# Patient Record
Sex: FEMALE | Race: WHITE | Employment: FULL TIME | URBAN - NONMETROPOLITAN AREA
[De-identification: names, ages, dates, MRNs, and addresses within clinical notes are randomized per-mention and may not be internally consistent; named-entity substitution may affect disease eponyms.]

---

## 2018-01-20 ENCOUNTER — HOSPITAL ENCOUNTER (EMERGENCY)
Age: 28
Discharge: HOME | End: 2018-01-20
Payer: COMMERCIAL

## 2018-01-20 VITALS
RESPIRATION RATE: 16 BRPM | OXYGEN SATURATION: 96 % | TEMPERATURE: 98.7 F | SYSTOLIC BLOOD PRESSURE: 114 MMHG | DIASTOLIC BLOOD PRESSURE: 73 MMHG | HEART RATE: 102 BPM

## 2018-01-20 DIAGNOSIS — J10.1 INFLUENZA A: Primary | ICD-10-CM

## 2018-01-20 LAB
FLU A ANTIGEN: POSITIVE
FLU B ANTIGEN: NEGATIVE

## 2018-01-20 PROCEDURE — 99202 OFFICE O/P NEW SF 15 MIN: CPT | Performed by: NURSE PRACTITIONER

## 2018-01-20 PROCEDURE — 99213 OFFICE O/P EST LOW 20 MIN: CPT

## 2018-01-20 PROCEDURE — 87804 INFLUENZA ASSAY W/OPTIC: CPT

## 2018-01-20 RX ORDER — OSELTAMIVIR PHOSPHATE 75 MG/1
75 CAPSULE ORAL 2 TIMES DAILY
Qty: 10 CAPSULE | Refills: 0 | Status: SHIPPED | OUTPATIENT
Start: 2018-01-20 | End: 2018-01-25

## 2018-01-20 RX ORDER — GUAIFENESIN AND CODEINE PHOSPHATE 100; 10 MG/5ML; MG/5ML
5 SOLUTION ORAL NIGHTLY PRN
Qty: 50 ML | Refills: 0 | Status: SHIPPED | OUTPATIENT
Start: 2018-01-20 | End: 2018-01-25

## 2018-01-20 RX ORDER — ALBUTEROL SULFATE 90 UG/1
2 AEROSOL, METERED RESPIRATORY (INHALATION) 3 TIMES DAILY
Qty: 1 INHALER | Refills: 0 | Status: SHIPPED | OUTPATIENT
Start: 2018-01-20 | End: 2020-04-23

## 2018-01-20 RX ORDER — PREDNISONE 10 MG/1
10 TABLET ORAL 2 TIMES DAILY
Qty: 10 TABLET | Refills: 0 | Status: SHIPPED | OUTPATIENT
Start: 2018-01-20 | End: 2018-01-25

## 2018-01-20 RX ORDER — ONDANSETRON 4 MG/1
4 TABLET, ORALLY DISINTEGRATING ORAL EVERY 8 HOURS PRN
Qty: 15 TABLET | Refills: 0 | Status: SHIPPED | OUTPATIENT
Start: 2018-01-20 | End: 2018-01-25

## 2018-01-20 ASSESSMENT — ENCOUNTER SYMPTOMS
VOMITING: 0
COUGH: 1
FLU SYMPTOMS: 1
SHORTNESS OF BREATH: 0
NAUSEA: 0
DIARRHEA: 0
SORE THROAT: 1
RHINORRHEA: 1

## 2018-01-20 ASSESSMENT — PAIN DESCRIPTION - PAIN TYPE: TYPE: ACUTE PAIN

## 2018-01-20 ASSESSMENT — PAIN DESCRIPTION - FREQUENCY: FREQUENCY: CONTINUOUS

## 2018-01-20 ASSESSMENT — PAIN DESCRIPTION - DESCRIPTORS: DESCRIPTORS: ACHING

## 2018-01-20 ASSESSMENT — PAIN DESCRIPTION - LOCATION: LOCATION: OTHER (COMMENT)

## 2018-01-20 ASSESSMENT — PAIN SCALES - GENERAL: PAINLEVEL_OUTOF10: 3

## 2018-01-20 NOTE — ED PROVIDER NOTES
[address]  Urgent Care Encounter      CHIEF COMPLAINT       Chief Complaint   Patient presents with    Fever     onset Thursday     Cough     onset Wednesday night, cough is gone     Generalized Body Aches       Nurses Notes reviewed and I agree except as noted in the HPI. HISTORY OF PRESENT ILLNESS   Karena Fields is a 32 y.o. The history is provided by the patient. No  was used. Influenza   Presenting symptoms: cough, fatigue, fever, headache, myalgias, rhinorrhea and sore throat    Presenting symptoms: no diarrhea, no nausea, no shortness of breath and no vomiting    Severity:  Severe  Onset quality:  Sudden  Duration:  3 days  Progression:  Unchanged  Chronicity:  New  Relieved by:  Nothing  Worsened by:  Nothing  Ineffective treatments:  OTC medications  Associated symptoms: chills, decreased appetite, decreased physical activity, ear pain and nasal congestion    Associated symptoms: no mental status change, no neck stiffness and no syncope    Risk factors: sick contacts    Risk factors: not elderly, no diabetes problem, no heart disease, no immunocompromised state, no kidney disease, no liver disease and not pregnant        REVIEW OF SYSTEMS     Review of Systems   Constitutional: Positive for chills, decreased appetite, fatigue and fever. HENT: Positive for congestion, ear pain, rhinorrhea and sore throat. Respiratory: Positive for cough. Negative for shortness of breath. Gastrointestinal: Negative for diarrhea, nausea and vomiting. Musculoskeletal: Positive for myalgias. Negative for neck stiffness. Neurological: Positive for headaches. PAST MEDICAL HISTORY         Diagnosis Date    Infertility, female     took 1 round of clomid       SURGICAL HISTORY     Patient  has a past surgical history that includes [place] tooth extraction.     CURRENT MEDICATIONS       Discharge Medication List as of 1/20/2018  2:33 PM      CONTINUE these medications Skin: Skin is warm and dry. She is not diaphoretic. Psychiatric: She has a normal mood and affect. Her behavior is normal. Judgment and thought content normal.   Nursing note and vitals reviewed. DIAGNOSTIC RESULTS   Labs:  Results for orders placed or performed during the hospital encounter of 01/20/18   Rapid influenza A/B antigens   Result Value Ref Range    Flu A Antigen POSITIVE (A) NEGATIVE    Flu B Antigen NEGATIVE NEGATIVE       IMAGING:  No orders to display     URGENT CARE COURSE:     Vitals:    01/20/18 1243   BP: 114/73   Pulse: 102   Resp: 16   Temp: 98.7 °F (37.1 °C)   TempSrc: Oral   SpO2: 96%       Medications - No data to display  PROCEDURES:  None  FINAL IMPRESSION      1. Influenza A        DISPOSITION/PLAN   Decision To Discharge     The patient was advised to drink plenty of fluids. They may take Tylenol for fever or body aches. Take prescribed medication as directed. They may also take OTC antihistamines/ decongestant as needed. Pt is advised to go to ER if symptoms worsen, new symptoms develop, high fever >102, vomiting, breathing difficulty, lethargy, chest pain or shortness of breath Dial 911. The patient or patient's representative is agreeable to the treatment plan they're advised to follow-up with her primary care provider in one week for reevaluation.       PATIENT REFERRED TO:  DO Awilda Lopez [address]    Schedule an appointment as soon as possible for a visit in 1 week  For re-check    DISCHARGE MEDICATIONS:  Discharge Medication List as of 1/20/2018  2:33 PM      START taking these medications    Details   oseltamivir (TAMIFLU) 75 MG capsule Take 1 capsule by mouth 2 times daily for 5 days, Disp-10 capsule, R-0Normal      ondansetron (ZOFRAN ODT) 4 MG disintegrating tablet Take 1 tablet by mouth every 8 hours as needed for Nausea, Disp-15 tablet, R-0Normal      guaiFENesin-codeine (TUSSI-ORGANIDIN NR) 100-10 MG/5ML syrup Take 5

## 2018-01-20 NOTE — ED TRIAGE NOTES
Patient walked to room 1 for fever, cough and body aches onset Wednesday night into Thursday. No other needs.

## 2018-04-18 ENCOUNTER — HOSPITAL ENCOUNTER (OUTPATIENT)
Dept: ULTRASOUND IMAGING | Age: 28
Discharge: HOME | End: 2018-04-18
Payer: COMMERCIAL

## 2018-04-18 DIAGNOSIS — R10.13 EPIGASTRIC PAIN: ICD-10-CM

## 2018-04-18 PROCEDURE — 76705 ECHO EXAM OF ABDOMEN: CPT

## 2019-09-17 ENCOUNTER — HOSPITAL ENCOUNTER (OUTPATIENT)
Age: 29
Discharge: HOME | End: 2019-09-17
Payer: COMMERCIAL

## 2019-09-17 LAB
ANION GAP SERPL CALCULATED.3IONS-SCNC: 14 MEQ/L (ref 8–16)
BASOPHILS # BLD: 0.8 %
BASOPHILS ABSOLUTE: 0.1 THOU/MM3 (ref 0–0.1)
BUN BLDV-MCNC: 10 MG/DL (ref 7–22)
CALCIUM SERPL-MCNC: 9.1 MG/DL (ref 8.5–10.5)
CHLORIDE BLD-SCNC: 97 MEQ/L (ref 98–111)
CO2: 27 MEQ/L (ref 23–33)
CREAT SERPL-MCNC: 0.9 MG/DL (ref 0.4–1.2)
EOSINOPHIL # BLD: 0.6 %
EOSINOPHILS ABSOLUTE: 0 THOU/MM3 (ref 0–0.4)
ERYTHROCYTE [DISTWIDTH] IN BLOOD BY AUTOMATED COUNT: 12.1 % (ref 11.5–14.5)
ERYTHROCYTE [DISTWIDTH] IN BLOOD BY AUTOMATED COUNT: 40.3 FL (ref 35–45)
GFR SERPL CREATININE-BSD FRML MDRD: 74 ML/MIN/1.73M2
GLUCOSE BLD-MCNC: 116 MG/DL (ref 70–108)
HCT VFR BLD CALC: 39.1 % (ref 37–47)
HEMOGLOBIN: 12.8 GM/DL (ref 12–16)
IMMATURE GRANS (ABS): 0.01 THOU/MM3 (ref 0–0.07)
IMMATURE GRANULOCYTES: 0 %
LYMPHOCYTES # BLD: 30.7 %
LYMPHOCYTES ABSOLUTE: 1.9 THOU/MM3 (ref 1–4.8)
MCH RBC QN AUTO: 29.8 PG (ref 26–33)
MCHC RBC AUTO-ENTMCNC: 32.7 GM/DL (ref 32.2–35.5)
MCV RBC AUTO: 91.1 FL (ref 81–99)
MONOCYTES # BLD: 7 %
MONOCYTES ABSOLUTE: 0.4 THOU/MM3 (ref 0.4–1.3)
NUCLEATED RED BLOOD CELLS: 0 /100 WBC
PLATELET # BLD: 248 THOU/MM3 (ref 130–400)
PMV BLD AUTO: 10.4 FL (ref 9.4–12.4)
POTASSIUM SERPL-SCNC: 3.7 MEQ/L (ref 3.5–5.2)
RBC # BLD: 4.29 MILL/MM3 (ref 4.2–5.4)
SEG NEUTROPHILS: 60.7 %
SEGMENTED NEUTROPHILS ABSOLUTE COUNT: 3.8 THOU/MM3 (ref 1.8–7.7)
SODIUM BLD-SCNC: 138 MEQ/L (ref 135–145)
WBC # BLD: 6.3 THOU/MM3 (ref 4.8–10.8)

## 2019-09-17 PROCEDURE — 36415 COLL VENOUS BLD VENIPUNCTURE: CPT

## 2019-09-17 PROCEDURE — 80048 BASIC METABOLIC PNL TOTAL CA: CPT

## 2019-09-17 PROCEDURE — 85025 COMPLETE CBC W/AUTO DIFF WBC: CPT

## 2020-03-12 ENCOUNTER — HOSPITAL ENCOUNTER (OUTPATIENT)
Dept: ULTRASOUND IMAGING | Age: 30
Discharge: HOME | End: 2020-03-12
Payer: COMMERCIAL

## 2020-03-12 PROCEDURE — 76705 ECHO EXAM OF ABDOMEN: CPT

## 2020-03-20 ENCOUNTER — HOSPITAL ENCOUNTER (OUTPATIENT)
Dept: CT IMAGING | Age: 30
Discharge: HOME | End: 2020-03-20
Payer: COMMERCIAL

## 2020-03-20 PROCEDURE — 6360000004 HC RX CONTRAST MEDICATION: Performed by: FAMILY MEDICINE

## 2020-03-20 PROCEDURE — 74160 CT ABDOMEN W/CONTRAST: CPT

## 2020-03-20 RX ADMIN — IOPAMIDOL 85 ML: 755 INJECTION, SOLUTION INTRAVENOUS at 09:31

## 2020-04-23 ENCOUNTER — OFFICE VISIT (OUTPATIENT)
Dept: SURGERY | Age: 30
End: 2020-04-23
Payer: COMMERCIAL

## 2020-04-23 VITALS
TEMPERATURE: 97.4 F | OXYGEN SATURATION: 98 % | WEIGHT: 166 LBS | SYSTOLIC BLOOD PRESSURE: 100 MMHG | HEIGHT: 66 IN | HEART RATE: 98 BPM | RESPIRATION RATE: 20 BRPM | BODY MASS INDEX: 26.68 KG/M2 | DIASTOLIC BLOOD PRESSURE: 64 MMHG

## 2020-04-23 PROCEDURE — G8427 DOCREV CUR MEDS BY ELIG CLIN: HCPCS | Performed by: SURGERY

## 2020-04-23 PROCEDURE — G8419 CALC BMI OUT NRM PARAM NOF/U: HCPCS | Performed by: SURGERY

## 2020-04-23 PROCEDURE — 99243 OFF/OP CNSLTJ NEW/EST LOW 30: CPT | Performed by: SURGERY

## 2020-04-23 RX ORDER — NORGESTIMATE AND ETHINYL ESTRADIOL 0.25-0.035
1 KIT ORAL DAILY
Status: ON HOLD | COMMUNITY
End: 2021-07-01

## 2020-04-23 ASSESSMENT — ENCOUNTER SYMPTOMS
VOMITING: 0
BACK PAIN: 1
NAUSEA: 1
COUGH: 0
SORE THROAT: 0
TROUBLE SWALLOWING: 0
COLOR CHANGE: 0
BLOOD IN STOOL: 0
SHORTNESS OF BREATH: 0
ABDOMINAL DISTENTION: 1
ABDOMINAL PAIN: 1
VOICE CHANGE: 0
DIARRHEA: 1
WHEEZING: 0

## 2020-04-23 NOTE — PATIENT INSTRUCTIONS
Patient Education        Cholecystectomy: Before Your Surgery  What is cholecystectomy? Cholecystectomy (be-sej-mmv-ALFONSO-tuh-john) is a type of surgery. It removes a diseased gallbladder. This surgery is usually done as a laparoscopic surgery. The doctor puts a lighted tube and other surgical tools through small cuts (incisions) in your belly. The tube is called a scope. It lets your doctor see your organs so he or she can do the surgery. The incisions leave scars that fade with time. Most people go home the same day. You probably will feel better each day. Most people have only a small amount of pain after 1 week. If you have a desk job, you can probably go back to work in 1 to 2 weeks. If you lift heavy objects or have a very active job, it may take up to 4 weeks. In some cases, open surgery is the best choice. Your doctor may choose open surgery in advance. Or he or she may choose it in the middle of laparoscopic surgery. In open surgery, the doctor makes a larger incision in your upper belly. If you have open surgery, you will probably stay in the hospital for 2 to 4 days. And it may take 4 to 6 weeks to get back to your normal routine. Follow-up care is a key part of your treatment and safety. Be sure to make and go to all appointments, and call your doctor if you are having problems. It's also a good idea to know your test results and keep a list of the medicines you take. What happens before surgery?   Surgery can be stressful. This information will help you understand what you can expect. And it will help you safely prepare for surgery.   Preparing for surgery    · Understand exactly what surgery is planned, along with the risks, benefits, and other options. · Tell your doctors ALL the medicines, vitamins, supplements, and herbal remedies you take.  Some of these can increase the risk of bleeding or interact with anesthesia.     · If you take aspirin or some other blood thinner, ask your doctor if driving, and getting back to your normal routine. When should you call your doctor? · You have questions or concerns.     · You don't understand how to prepare for your surgery.     · You become ill before the surgery (such as fever, flu, or a cold).     · You need to reschedule or have changed your mind about having the surgery. Where can you learn more? Go to https://Provus Labpepicewnorin.tv.soup.me. org and sign in to your [employer] account. Enter H065 in the [employer] box to learn more about \"Cholecystectomy: Before Your Surgery. \"     If you do not have an account, please click on the \"Sign Up Now\" link. Current as of: August 11, 2019Content Version: 12.4  © 3965-6971 Healthwise, Incorporated. Care instructions adapted under license by [facility] ([facility]). If you have questions about a medical condition or this instruction, always ask your healthcare professional. [place] [address] any warranty or liability for your use of this information. Patient Education        Low-Fat Diet for Gallbladder Disease: Care Instructions  Your Care Instructions    When you eat, the gallbladder releases bile, which helps you digest the fat in food. If you have an inflamed gallbladder, this may cause pain. A low-fat diet may give your gallbladder a rest so you can start to heal. Your doctor and dietitian can help you make an eating plan that does not irritate your digestive system. Always talk with your doctor or dietitian before you make changes in your diet. Follow-up care is a key part of your treatment and safety. Be sure to make and go to all appointments, and call your doctor if you are having problems. It's also a good idea to know your test results and keep a list of the medicines you take. How can you care for yourself at home? · Eat many small meals and snacks each day instead of three large meals. · Choose lean meats. ? Eat no more than 5 to 6½ ounces of meat a day.   ? Cut off all fat you can see. ? Eat chicken and turkey without the skin. ? Many types of fish, such as salmon, lake trout, tuna, and herring, provide healthy omega-3 fat. But, avoid fish canned in oil, such as sardines in olive oil. ? Bake, broil, or grill meats, poultry, or fish instead of frying them in butter or fat. · Drink or eat nonfat or low-fat milk, yogurt, cheese, or other milk products each day. ? Read the labels on cheeses, and choose those with less than 5 grams of fat an ounce. ? Try fat-free sour cream, cream cheese, or yogurt. ? Avoid cream soups and cream sauces on pasta. ? Eat low-fat ice cream, frozen yogurt, or sorbet. Avoid regular ice cream.  · Eat whole-grain cereals, breads, crackers, rice, or pasta. Avoid high-fat foods such as croissants, scones, biscuits, waffles, doughnuts, muffins, granola, and high-fat breads. · Flavor your foods with herbs and spices (such as basil, tarragon, or mint), fat-free sauces, or lemon juice instead of butter. You can also use butter substitutes, fat-free mayonnaise, or fat-free dressing. · Try applesauce, prune puree, or mashed bananas to replace some or all of the fat when you bake. · Limit fats and oils, such as butter, margarine, mayonnaise, and salad dressing, to no more than 1 tablespoon a meal.  · Avoid high-fat foods, such as:  ? Chocolate, whole milk, ice cream, and processed cheese. ? Fried or buttered foods. ? Sausage, salami, and martínez. ? Cinnamon rolls, cakes, pies, cookies, and other pastries. ? Prepared snack foods, such as potato chips, nut and granola bars, and mixed nuts. ? Coconut and avocado. · Learn how to read food labels for serving sizes and ingredients. Fast-food and convenience-food meals often have lots of fat. Where can you learn more? Go to https://RADSONEalexi.health-partners. org and sign in to your M-DISChart account.  Enter A473 in the [facility] box to learn more about \"Low-Fat Diet for Gallbladder Disease: Care

## 2020-04-23 NOTE — PROGRESS NOTES
Anabel Lindsay MD   General Surgery  New Patient Evaluation in Office  Pt Name: Gage Jones  Date of Birth 1990   Today's Date: 4/23/2020  Medical Record Number: [id number]  Referring Provider: Diana Oliva DO  Primary Care Provider: Chloe Correa DO  Chief Complaint:  Chief Complaint   Patient presents with   [employer] [facility] Consult     ref by Dr. Latonia Norton in gallbladder US at [address]      1. Gallbladder sludge    2. RUQ abdominal pain    3. Recurrent biliary colic         PLANS      1. Schedule patient for robotic assisted laparoscopic cholecystectomy. 2.  General anesthesia  3. Patient with recurring episodes of pain with pain occurring greater than 50% of the time with inability to eat and weight loss. 4.  Hepatic function tests  5. Risks of procedure including but not limited to bleeding, infection, conversion open procedure, postoperative diarrhea, persistence of symptoms, bile duct leak, bile duct injury were all discussed. Patient expressed understanding wishes to proceed with surgical intervention. Lesa De Luna is a 27y.o. year old female who is presenting today in the office for evaluation of biliary colic symptoms which are persistent with a large amount of sludge in the gallbladder. Elsa was referred to our office for evaluation by her primary care provider Dr. Yvrose Hilliard. Elsa reports a greater than 2-week history of severe pain in the right upper quadrant with radiation to the back as well as in the right flank. She has had no fever nor chills. She initially had some diarrhea and very pale almost acholic stools but this has resolved. She underwent an ultrasound of the gallbladder which revealed large amounts of sludge within the gallbladder. There is also question of a liver cyst.  She then underwent a CT scan of the abdomen and pelvis which revealed small benign simple cysts of the liver. Gallbladder was distended.   She had some mildly enlarged file   Social Needs    Financial resource strain: Not on file    Food insecurity     Worry: Not on file     Inability: Not on file    Transportation needs     Medical: Not on file     Non-medical: Not on file   Tobacco Use    Smoking status: Never Smoker    Smokeless tobacco: Never Used   Substance and Sexual Activity    Alcohol use: No    Drug use: No    Sexual activity: Yes     Partners: Male   Lifestyle    Physical activity     Days per week: Not on file     Minutes per session: Not on file    Stress: Not on file   Relationships    Social connections     Talks on phone: Not on file     Gets together: Not on file     Attends Restorationism service: Not on file     Active member of club or organization: Not on file     Attends meetings of clubs or organizations: Not on file     Relationship status: Not on file    Intimate partner violence     Fear of current or ex partner: Not on file     Emotionally abused: Not on file     Physically abused: Not on file     Forced sexual activity: Not on file   Other Topics Concern    Not on file   Social History Narrative    Not on file           Review of Systems  Review of Systems   Constitutional: Positive for activity change, appetite change and fatigue. Negative for chills, fever and unexpected weight change. HENT: Negative for sore throat, trouble swallowing and voice change. Eyes: Negative for visual disturbance. Respiratory: Negative for cough, shortness of breath and wheezing. Cardiovascular: Negative for chest pain and palpitations. Gastrointestinal: Positive for abdominal distention, abdominal pain, diarrhea and nausea. Negative for blood in stool and vomiting. Diarrhea now resolved   Endocrine: Negative for cold intolerance, heat intolerance and polydipsia. Genitourinary: Positive for flank pain. Negative for dysuria and hematuria. Musculoskeletal: Positive for back pain. Negative for gait problem, joint swelling and myalgias.    Skin: Negative for color change and rash. Allergic/Immunologic: Negative for immunocompromised state. Neurological: Negative for dizziness, tremors, seizures and speech difficulty. Hematological: Does not bruise/bleed easily. Psychiatric/Behavioral: Negative for behavioral problems, confusion and suicidal ideas. OBJECTIVE     /64 (Site: Right Upper Arm, Position: Sitting, Cuff Size: Medium Adult)   Pulse 98   Temp 97.4 °F (36.3 °C) (Temporal)   Resp 20   Ht 5' 6\" (1.676 m)   Wt 166 lb (75.3 kg)   LMP 03/18/2020 (Approximate)   SpO2 98%   Breastfeeding No   BMI 26.79 kg/m²      Physical Exam  Vitals signs reviewed. Constitutional:       General: She is not in acute distress. Appearance: She is well-developed and normal weight. She is not diaphoretic. HENT:      Head: Normocephalic and atraumatic. Nose: Nose normal. No congestion or rhinorrhea. Mouth/Throat:      Mouth: Mucous membranes are moist.   Eyes:      General: No scleral icterus. Pupils: Pupils are equal, round, and reactive to light. Neck:      Musculoskeletal: Normal range of motion and neck supple. Thyroid: No thyromegaly. Vascular: No JVD. Trachea: No tracheal deviation. Cardiovascular:      Rate and Rhythm: Normal rate and regular rhythm. Heart sounds: Normal heart sounds. No murmur. Pulmonary:      Effort: Pulmonary effort is normal. No respiratory distress. Breath sounds: Normal breath sounds. No wheezing. Abdominal:      General: Bowel sounds are normal. There is no distension. Palpations: Abdomen is soft. There is no mass. Tenderness: There is abdominal tenderness. There is no guarding or rebound. Hernia: No hernia is present. Comments: Pain with palpation of the right upper quadrant. No mass palpable. Musculoskeletal: Normal range of motion. Right lower leg: No edema. Left lower leg: No edema.    Lymphadenopathy:      Cervical: No cervical

## 2020-04-27 ENCOUNTER — ANESTHESIA EVENT (OUTPATIENT)
Dept: OPERATING ROOM | Age: 30
End: 2020-04-27
Payer: COMMERCIAL

## 2020-04-27 ENCOUNTER — HOSPITAL ENCOUNTER (OUTPATIENT)
Age: 30
Setting detail: OUTPATIENT SURGERY
Discharge: HOME | End: 2020-04-27
Attending: SURGERY | Admitting: SURGERY
Payer: COMMERCIAL

## 2020-04-27 ENCOUNTER — ANESTHESIA (OUTPATIENT)
Dept: OPERATING ROOM | Age: 30
End: 2020-04-27
Payer: COMMERCIAL

## 2020-04-27 VITALS — TEMPERATURE: 98.1 F | DIASTOLIC BLOOD PRESSURE: 74 MMHG | SYSTOLIC BLOOD PRESSURE: 115 MMHG | OXYGEN SATURATION: 99 %

## 2020-04-27 VITALS
HEIGHT: 66 IN | DIASTOLIC BLOOD PRESSURE: 57 MMHG | SYSTOLIC BLOOD PRESSURE: 102 MMHG | BODY MASS INDEX: 26.55 KG/M2 | TEMPERATURE: 97.1 F | OXYGEN SATURATION: 97 % | RESPIRATION RATE: 17 BRPM | HEART RATE: 82 BPM | WEIGHT: 165.2 LBS

## 2020-04-27 PROBLEM — K80.50 BILIARY COLIC: Status: ACTIVE | Noted: 2020-04-27

## 2020-04-27 LAB — PREGNANCY, URINE: NEGATIVE

## 2020-04-27 PROCEDURE — 88304 TISSUE EXAM BY PATHOLOGIST: CPT

## 2020-04-27 PROCEDURE — 7100000000 HC PACU RECOVERY - FIRST 15 MIN: Performed by: SURGERY

## 2020-04-27 PROCEDURE — S2900 ROBOTIC SURGICAL SYSTEM: HCPCS | Performed by: SURGERY

## 2020-04-27 PROCEDURE — 2500000003 HC RX 250 WO HCPCS: Performed by: SURGERY

## 2020-04-27 PROCEDURE — 47563 LAPARO CHOLECYSTECTOMY/GRAPH: CPT | Performed by: SURGERY

## 2020-04-27 PROCEDURE — 2580000003 HC RX 258: Performed by: SURGERY

## 2020-04-27 PROCEDURE — 2709999900 HC NON-CHARGEABLE SUPPLY: Performed by: SURGERY

## 2020-04-27 PROCEDURE — 6370000000 HC RX 637 (ALT 250 FOR IP): Performed by: SURGERY

## 2020-04-27 PROCEDURE — 3600000009 HC SURGERY ROBOT BASE: Performed by: SURGERY

## 2020-04-27 PROCEDURE — 7100000001 HC PACU RECOVERY - ADDTL 15 MIN: Performed by: SURGERY

## 2020-04-27 PROCEDURE — 7100000011 HC PHASE II RECOVERY - ADDTL 15 MIN: Performed by: SURGERY

## 2020-04-27 PROCEDURE — 2720000010 HC SURG SUPPLY STERILE: Performed by: SURGERY

## 2020-04-27 PROCEDURE — 6360000002 HC RX W HCPCS: Performed by: SURGERY

## 2020-04-27 PROCEDURE — 3700000000 HC ANESTHESIA ATTENDED CARE: Performed by: SURGERY

## 2020-04-27 PROCEDURE — 3700000001 HC ADD 15 MINUTES (ANESTHESIA): Performed by: SURGERY

## 2020-04-27 PROCEDURE — 7100000010 HC PHASE II RECOVERY - FIRST 15 MIN: Performed by: SURGERY

## 2020-04-27 PROCEDURE — 2500000003 HC RX 250 WO HCPCS: Performed by: NURSE ANESTHETIST, CERTIFIED REGISTERED

## 2020-04-27 PROCEDURE — C1894 INTRO/SHEATH, NON-LASER: HCPCS | Performed by: SURGERY

## 2020-04-27 PROCEDURE — 81025 URINE PREGNANCY TEST: CPT

## 2020-04-27 PROCEDURE — 6360000002 HC RX W HCPCS: Performed by: NURSE ANESTHETIST, CERTIFIED REGISTERED

## 2020-04-27 PROCEDURE — 3600000019 HC SURGERY ROBOT ADDTL 15MIN: Performed by: SURGERY

## 2020-04-27 RX ORDER — ONDANSETRON 2 MG/ML
INJECTION INTRAMUSCULAR; INTRAVENOUS PRN
Status: DISCONTINUED | OUTPATIENT
Start: 2020-04-27 | End: 2020-04-27 | Stop reason: SDUPTHER

## 2020-04-27 RX ORDER — FENTANYL CITRATE 50 UG/ML
50 INJECTION, SOLUTION INTRAMUSCULAR; INTRAVENOUS EVERY 5 MIN PRN
Status: DISCONTINUED | OUTPATIENT
Start: 2020-04-27 | End: 2020-04-27 | Stop reason: HOSPADM

## 2020-04-27 RX ORDER — NEOSTIGMINE METHYLSULFATE 5 MG/5 ML
SYRINGE (ML) INTRAVENOUS PRN
Status: DISCONTINUED | OUTPATIENT
Start: 2020-04-27 | End: 2020-04-27 | Stop reason: SDUPTHER

## 2020-04-27 RX ORDER — PHENYLEPHRINE HYDROCHLORIDE 10 MG/ML
INJECTION INTRAVENOUS PRN
Status: DISCONTINUED | OUTPATIENT
Start: 2020-04-27 | End: 2020-04-27 | Stop reason: SDUPTHER

## 2020-04-27 RX ORDER — SODIUM CHLORIDE 0.9 % (FLUSH) 0.9 %
10 SYRINGE (ML) INJECTION EVERY 12 HOURS SCHEDULED
Status: DISCONTINUED | OUTPATIENT
Start: 2020-04-27 | End: 2020-04-27 | Stop reason: HOSPADM

## 2020-04-27 RX ORDER — HYDROCODONE BITARTRATE AND ACETAMINOPHEN 5; 325 MG/1; MG/1
1 TABLET ORAL EVERY 4 HOURS PRN
Status: DISCONTINUED | OUTPATIENT
Start: 2020-04-27 | End: 2020-04-27 | Stop reason: HOSPADM

## 2020-04-27 RX ORDER — SODIUM CHLORIDE 0.9 % (FLUSH) 0.9 %
10 SYRINGE (ML) INJECTION PRN
Status: DISCONTINUED | OUTPATIENT
Start: 2020-04-27 | End: 2020-04-27 | Stop reason: HOSPADM

## 2020-04-27 RX ORDER — MIDAZOLAM HYDROCHLORIDE 1 MG/ML
INJECTION INTRAMUSCULAR; INTRAVENOUS PRN
Status: DISCONTINUED | OUTPATIENT
Start: 2020-04-27 | End: 2020-04-27 | Stop reason: SDUPTHER

## 2020-04-27 RX ORDER — PROPOFOL 10 MG/ML
INJECTION, EMULSION INTRAVENOUS PRN
Status: DISCONTINUED | OUTPATIENT
Start: 2020-04-27 | End: 2020-04-27 | Stop reason: SDUPTHER

## 2020-04-27 RX ORDER — PROMETHAZINE HYDROCHLORIDE 25 MG/ML
12.5 INJECTION, SOLUTION INTRAMUSCULAR; INTRAVENOUS
Status: DISCONTINUED | OUTPATIENT
Start: 2020-04-27 | End: 2020-04-27 | Stop reason: HOSPADM

## 2020-04-27 RX ORDER — PROMETHAZINE HYDROCHLORIDE 25 MG/1
12.5 TABLET ORAL EVERY 6 HOURS PRN
Status: DISCONTINUED | OUTPATIENT
Start: 2020-04-27 | End: 2020-04-27 | Stop reason: HOSPADM

## 2020-04-27 RX ORDER — HYDROCODONE BITARTRATE AND ACETAMINOPHEN 5; 325 MG/1; MG/1
2 TABLET ORAL EVERY 4 HOURS PRN
Status: DISCONTINUED | OUTPATIENT
Start: 2020-04-27 | End: 2020-04-27 | Stop reason: HOSPADM

## 2020-04-27 RX ORDER — ROCURONIUM BROMIDE 10 MG/ML
INJECTION, SOLUTION INTRAVENOUS PRN
Status: DISCONTINUED | OUTPATIENT
Start: 2020-04-27 | End: 2020-04-27 | Stop reason: SDUPTHER

## 2020-04-27 RX ORDER — SUCCINYLCHOLINE/SOD CL,ISO/PF 200MG/10ML
SYRINGE (ML) INTRAVENOUS PRN
Status: DISCONTINUED | OUTPATIENT
Start: 2020-04-27 | End: 2020-04-27 | Stop reason: SDUPTHER

## 2020-04-27 RX ORDER — SODIUM CHLORIDE 9 MG/ML
INJECTION, SOLUTION INTRAVENOUS CONTINUOUS
Status: DISCONTINUED | OUTPATIENT
Start: 2020-04-27 | End: 2020-04-27 | Stop reason: HOSPADM

## 2020-04-27 RX ORDER — LIDOCAINE HYDROCHLORIDE 20 MG/ML
INJECTION, SOLUTION INTRAVENOUS PRN
Status: DISCONTINUED | OUTPATIENT
Start: 2020-04-27 | End: 2020-04-27 | Stop reason: SDUPTHER

## 2020-04-27 RX ORDER — BUPIVACAINE HYDROCHLORIDE 5 MG/ML
INJECTION, SOLUTION PERINEURAL PRN
Status: DISCONTINUED | OUTPATIENT
Start: 2020-04-27 | End: 2020-04-27 | Stop reason: ALTCHOICE

## 2020-04-27 RX ORDER — ONDANSETRON 4 MG/1
4 TABLET, ORALLY DISINTEGRATING ORAL EVERY 8 HOURS PRN
Qty: 6 TABLET | Refills: 0 | Status: ON HOLD | OUTPATIENT
Start: 2020-04-27 | End: 2021-07-01

## 2020-04-27 RX ORDER — FENTANYL CITRATE 50 UG/ML
INJECTION, SOLUTION INTRAMUSCULAR; INTRAVENOUS PRN
Status: DISCONTINUED | OUTPATIENT
Start: 2020-04-27 | End: 2020-04-27 | Stop reason: SDUPTHER

## 2020-04-27 RX ORDER — LABETALOL 20 MG/4 ML (5 MG/ML) INTRAVENOUS SYRINGE
10 EVERY 10 MIN PRN
Status: DISCONTINUED | OUTPATIENT
Start: 2020-04-27 | End: 2020-04-27 | Stop reason: HOSPADM

## 2020-04-27 RX ORDER — GLYCOPYRROLATE 1 MG/5 ML
SYRINGE (ML) INTRAVENOUS PRN
Status: DISCONTINUED | OUTPATIENT
Start: 2020-04-27 | End: 2020-04-27 | Stop reason: SDUPTHER

## 2020-04-27 RX ORDER — KETOROLAC TROMETHAMINE 30 MG/ML
INJECTION, SOLUTION INTRAMUSCULAR; INTRAVENOUS PRN
Status: DISCONTINUED | OUTPATIENT
Start: 2020-04-27 | End: 2020-04-27 | Stop reason: SDUPTHER

## 2020-04-27 RX ORDER — ONDANSETRON 2 MG/ML
4 INJECTION INTRAMUSCULAR; INTRAVENOUS EVERY 6 HOURS PRN
Status: DISCONTINUED | OUTPATIENT
Start: 2020-04-27 | End: 2020-04-27 | Stop reason: HOSPADM

## 2020-04-27 RX ORDER — INDOCYANINE GREEN AND WATER 25 MG
2.5 KIT INJECTION ONCE
Status: COMPLETED | OUTPATIENT
Start: 2020-04-27 | End: 2020-04-27

## 2020-04-27 RX ORDER — HYDROCODONE BITARTRATE AND ACETAMINOPHEN 5; 325 MG/1; MG/1
1 TABLET ORAL EVERY 6 HOURS PRN
Qty: 28 TABLET | Refills: 0 | Status: SHIPPED | OUTPATIENT
Start: 2020-04-27 | End: 2020-05-04

## 2020-04-27 RX ORDER — DEXAMETHASONE SODIUM PHOSPHATE 4 MG/ML
INJECTION, SOLUTION INTRA-ARTICULAR; INTRALESIONAL; INTRAMUSCULAR; INTRAVENOUS; SOFT TISSUE PRN
Status: DISCONTINUED | OUTPATIENT
Start: 2020-04-27 | End: 2020-04-27 | Stop reason: SDUPTHER

## 2020-04-27 RX ADMIN — PROPOFOL 50 MG: 10 INJECTION, EMULSION INTRAVENOUS at 08:52

## 2020-04-27 RX ADMIN — DEXAMETHASONE SODIUM PHOSPHATE 10 MG: 4 INJECTION, SOLUTION INTRAMUSCULAR; INTRAVENOUS at 09:05

## 2020-04-27 RX ADMIN — ONDANSETRON HYDROCHLORIDE 4 MG: 4 INJECTION, SOLUTION INTRAMUSCULAR; INTRAVENOUS at 09:05

## 2020-04-27 RX ADMIN — Medication 4 MG: at 09:15

## 2020-04-27 RX ADMIN — FENTANYL CITRATE 25 MCG: 50 INJECTION, SOLUTION INTRAMUSCULAR; INTRAVENOUS at 09:30

## 2020-04-27 RX ADMIN — MIDAZOLAM HYDROCHLORIDE 2 MG: 1 INJECTION, SOLUTION INTRAMUSCULAR; INTRAVENOUS at 08:42

## 2020-04-27 RX ADMIN — PHENYLEPHRINE HYDROCHLORIDE 150 MCG: 10 INJECTION INTRAVENOUS at 09:02

## 2020-04-27 RX ADMIN — LIDOCAINE HYDROCHLORIDE 60 MG: 20 INJECTION, SOLUTION INTRAVENOUS at 08:44

## 2020-04-27 RX ADMIN — ROCURONIUM BROMIDE 10 MG: 10 INJECTION INTRAVENOUS at 08:44

## 2020-04-27 RX ADMIN — FENTANYL CITRATE 100 MCG: 50 INJECTION, SOLUTION INTRAMUSCULAR; INTRAVENOUS at 08:52

## 2020-04-27 RX ADMIN — SODIUM CHLORIDE: 9 INJECTION, SOLUTION INTRAVENOUS at 08:41

## 2020-04-27 RX ADMIN — INDOCYANINE GREEN AND WATER 2.5 MG: KIT at 08:20

## 2020-04-27 RX ADMIN — HYDROCODONE BITARTRATE AND ACETAMINOPHEN 2 TABLET: 5; 325 TABLET ORAL at 10:00

## 2020-04-27 RX ADMIN — LIDOCAINE HYDROCHLORIDE 40 MG: 20 INJECTION, SOLUTION INTRAVENOUS at 09:15

## 2020-04-27 RX ADMIN — Medication 100 MG: at 08:44

## 2020-04-27 RX ADMIN — ROCURONIUM BROMIDE 25 MG: 10 INJECTION INTRAVENOUS at 08:50

## 2020-04-27 RX ADMIN — FENTANYL CITRATE 50 MCG: 50 INJECTION, SOLUTION INTRAMUSCULAR; INTRAVENOUS at 08:44

## 2020-04-27 RX ADMIN — KETOROLAC TROMETHAMINE 30 MG: 30 INJECTION, SOLUTION INTRAMUSCULAR at 09:20

## 2020-04-27 RX ADMIN — FENTANYL CITRATE 25 MCG: 50 INJECTION, SOLUTION INTRAMUSCULAR; INTRAVENOUS at 09:25

## 2020-04-27 RX ADMIN — ONDANSETRON 4 MG: 2 INJECTION INTRAMUSCULAR; INTRAVENOUS at 10:47

## 2020-04-27 RX ADMIN — Medication 0.6 MG: at 09:15

## 2020-04-27 RX ADMIN — PROPOFOL 150 MG: 10 INJECTION, EMULSION INTRAVENOUS at 08:44

## 2020-04-27 RX ADMIN — CEFOXITIN 2 G: 2 INJECTION, POWDER, FOR SOLUTION INTRAVENOUS at 08:50

## 2020-04-27 ASSESSMENT — PULMONARY FUNCTION TESTS
PIF_VALUE: 3
PIF_VALUE: 17
PIF_VALUE: 18
PIF_VALUE: 19
PIF_VALUE: 2
PIF_VALUE: 18
PIF_VALUE: 18
PIF_VALUE: 13
PIF_VALUE: 16
PIF_VALUE: 2
PIF_VALUE: 13
PIF_VALUE: 18
PIF_VALUE: 18
PIF_VALUE: 16
PIF_VALUE: 12
PIF_VALUE: 18
PIF_VALUE: 13
PIF_VALUE: 13
PIF_VALUE: 15
PIF_VALUE: 0
PIF_VALUE: 1
PIF_VALUE: 17
PIF_VALUE: 18
PIF_VALUE: 16
PIF_VALUE: 18
PIF_VALUE: 0
PIF_VALUE: 2
PIF_VALUE: 15
PIF_VALUE: 16
PIF_VALUE: 1
PIF_VALUE: 0
PIF_VALUE: 15
PIF_VALUE: 15
PIF_VALUE: 2
PIF_VALUE: 2
PIF_VALUE: 18
PIF_VALUE: 18
PIF_VALUE: 15
PIF_VALUE: 18
PIF_VALUE: 15
PIF_VALUE: 2
PIF_VALUE: 18
PIF_VALUE: 1
PIF_VALUE: 14
PIF_VALUE: 16
PIF_VALUE: 14

## 2020-04-27 ASSESSMENT — PAIN DESCRIPTION - LOCATION
LOCATION: ABDOMEN

## 2020-04-27 ASSESSMENT — PAIN SCALES - GENERAL
PAINLEVEL_OUTOF10: 5
PAINLEVEL_OUTOF10: 7

## 2020-04-27 ASSESSMENT — PAIN DESCRIPTION - PAIN TYPE
TYPE: SURGICAL PAIN

## 2020-04-27 ASSESSMENT — PAIN DESCRIPTION - ORIENTATION
ORIENTATION: RIGHT;UPPER
ORIENTATION: RIGHT;UPPER

## 2020-04-27 ASSESSMENT — PAIN - FUNCTIONAL ASSESSMENT: PAIN_FUNCTIONAL_ASSESSMENT: 0-10

## 2020-04-27 ASSESSMENT — PAIN DESCRIPTION - DESCRIPTORS: DESCRIPTORS: ACHING;DULL

## 2020-04-27 NOTE — BRIEF OP NOTE
Brief Postoperative Note      Patient: Bob Person  YOB: 1990  MRN: [id number]    Date of Procedure: 4/27/2020    Pre-Op Diagnosis: GALLBLADDER SLUDGE, RUQ ABD PAIN    Post-Op Diagnosis: Same       Procedure(s):  CHOLECYSTECTOMY LAPAROSCOPIC ROBOTIC,ICG cholangiography    Surgeon(s):  Darwin Milton MD    Assistant:  Surgical Assistant: Manjinder Rosenberg    Anesthesia: General    Estimated Blood Loss (mL): Minimal    Complications: None    Specimens:   ID Type Source Tests Collected by Time Destination   A : gallbladder Tissue Gallbladder SURGICAL PATHOLOGY Darwin Milton MD 4/27/2020 8974        Implants:  * No implants in log *      Drains: * No LDAs found *    Findings:     Electronically signed by Darwin Milton MD on 4/27/2020 at 9:25 AM

## 2020-04-27 NOTE — H&P
kidney: Visualized portions of the pancreas and right kidney unremarkable. There is a 1 cm slightly complex appearing cyst in left hepatic lobe, possibly a partially collapsed cyst.       Common bile duct: Normal in caliber. .       Gallbladder : The gallbladder is normal in size and contour with no wall thickening or pericholecystic edema. . No gallstones are seen but there is a large amount of sludge in the gallbladder.               Impression   1. No gallstones. No acute findings. Large amount of sludge in the gallbladder. 2. 1 cm slightly complex appearing cyst in left hepatic lobe, possibly a partially collapsed cyst. CT abdomen without and with contrast enhancement recommended for further evaluation.                 **This report has been created using voice recognition software.  It may contain minor errors which are inherent in voice recognition technology. **       Final report electronically signed by Dr. Silke Goode on 3/12/2020 8:08 AM              CT ABDOMEN WITH CONTRAST ENHANCEMENT:       CLINICAL INFORMATION: Hepatic cyst       COMPARISON: 4/21/2006       TECHNIQUE: Multiple axial 5 mm images of the abdomen and lung bases were obtained following the administration of oral and intravenous contrast material (ISOVUE). Images were obtained at 30 seconds and 70 seconds post injection. Computer generated    sagittal and coronal images of the abdomen were also reconstructed. ALL CT SCANS AT THIS FACILITY use dose modulation, iterative reconstruction, and/or weight-based dosing when appropriate to reduce radiation dose to as low as reasonably achievable.           FINDINGS:        Lung bases: Unremarkable. No infiltrates. No effusions. No lung nodules.       Liver: There is a small 6 mm benign-appearing cyst in left hepatic lobe. There is also a small benign-appearing 7 mm cyst in the right hepatic lobe. Neither of these were present on the prior study. Findings of hepatic steatosis.  There is no evidence for    contrast enhancement. Gallbladder unremarkable       Pancreas, spleen and adrenal glands: Unremarkable       Kidneys:  Unremarkable. No renal calculi. No cysts. No mass lesions. No hydronephrosis. .       Bowel/Peritoneum: No abnormally dilated bowel loops are seen. There is a large amount of fecal material throughout the colon, consistent. The appendix is incompletely demonstrated but appears unremarkable. No free air. No free fluid in the abdomen. No    abnormally enlarged para-aortic lymph nodes are seen. There are a few small mesenteric lymph nodes scattered in the abdomen, consistent with mesenteric adenitis.       Bones : No evidence for acute fracture or bone destruction.               Impression   1. Findings of constipation, hepatic steatosis, and mesenteric adenitis. 2. There are 2 small benign-appearing subcentimeter cysts in the liver.               **This report has been created using voice recognition software.  It may contain minor errors which are inherent in voice recognition technology. **       Final report electronically signed by Dr. Cecil Richardson on 3/20/2020 10:48 AM      Imaging reviewed

## 2020-04-27 NOTE — ANESTHESIA PRE PROCEDURE
Department of Anesthesiology  Preprocedure Note       Name:  Mick Hartley   Age:  27 y.o.  :  1990                                          MRN:  [id number]         Date:  2020      Surgeon: Campbell Roa):  Sade Brand MD    Procedure: CHOLECYSTECTOMY LAPAROSCOPIC ROBOTIC, POSS OPEN (N/A Abdomen)    Medications prior to admission:   Prior to Admission medications    Medication Sig Start Date End Date Taking? Authorizing Provider   norgestimate-ethinyl estradiol ([address] [address]) 0.25-35 MG-MCG per tablet Take 1 tablet by mouth daily    Historical Provider, MD   sertraline (ZOLOFT) 50 MG tablet Take 50 mg by mouth daily    Historical Provider, MD   ibuprofen (ADVIL;MOTRIN) 200 MG tablet Take 4 tablets by mouth every 8 hours as needed for Pain  Patient not taking: Reported on 2020   Ivan Hoang MD   Loratadine (CLARITIN PO) Take by mouth    Historical Provider, MD       Current medications:    Current Facility-Administered Medications   Medication Dose Route Frequency Provider Last Rate Last Dose    0.9 % sodium chloride infusion   Intravenous Continuous Sade Brand MD        sodium chloride flush 0.9 % injection 10 mL  10 mL Intravenous 2 times per day Sade Brand MD        sodium chloride flush 0.9 % injection 10 mL  10 mL Intravenous PRN Sade Brand MD        cefOXitin (MEFOXIN) 2 g in dextrose 5% 50 mL (mini-bag)  2 g Intravenous On Call to [address] [place] [address], MD        indocyanine green (IC-GREEN) syringe 2.5 mg  2.5 mg Intravenous Once Sade Brand MD           Allergies: Allergies   Allergen Reactions    Azithromycin Hives     z pack-rash    Sulfa Antibiotics      Reaction as a kid         Problem List:  There is no problem list on file for this patient.       Past Medical History:        Diagnosis Date    Infertility, female     took 1 round of clomid       Past Surgical History:        Procedure Laterality Date    TONSILLECTOMY  2018    WISDOM TOOTH EXTRACTION      [place]

## 2020-04-27 NOTE — OP NOTE
[address] [id number]                                OPERATIVE REPORT    PATIENT NAME: Grace Johnson                    :        1990  MED REC NO:   [id number]                           ROOM:  ACCOUNT NO:   [de-identified]                           ADMIT DATE: 2020  PROVIDER:     Mo Bermudez M.D.    DATE OF PROCEDURE:  2020    PREOPERATIVE DIAGNOSES:  1.  Gallbladder sludge. 2.  Recurrent right upper quadrant pain consistent with recurrent  biliary colic. POSTOPERATIVE DIAGNOSES:  1.  Gallbladder sludge. 2.  Recurrent right upper quadrant pain consistent with recurrent  biliary colic. PROCEDURE:  Robotic-assisted laparoscopic cholecystectomy with ICG green  cholangiography. SURGEON:  Mo Bermudez MD.    ANESTHESIA:  General.    ESTIMATED BLOOD LOSS:  Less than 10 mL. SPECIMEN:  Gallbladder to Pathology. INDICATIONS:  The patient is a 27-year-old female who presented to the  office with biliary colic symptoms and ultrasound showed a large amount  of sludge in the gallbladder. Her symptoms were very consistent with  biliary colic. Recommended cholecystectomy. Risks of procedure were  discussed and surgical approaches discussed in the office. She opted to  proceed with robotic-assisted laparoscopic cholecystectomy. PROCEDURE:  The patient was brought to the operating room. She had been  injected in the preoperative area of same-day surgery with ICG green  dye. She was brought to the operating suite where she was placed supine  on the operating table with pneumatic sequential compression devices on  her lower extremities. After induction of general anesthesia, abdomen  was prepped and draped. Time-out was performed. Transverse incision was made just below the umbilicus. Dissection was  carried down to the fascia. The fascia was elevated with Kocher clamps  and incised.   A 12 mm Xi port was placed in an open fashion and CO2  pneumoperitoneum was introduced. Reducer was placed. Additional 8 mm  Xi ports were placed in the midaxillary line on the right and left side  of the abdomen under direct visualization. A 5 mm assistant port was  placed far out right laterally also under direct visualization. The  patient was placed in reverse Trendelenburg and turned with the left  side down. The Xi robot was then docked from the patient's right side. Instruments were inserted and I retired to the console. Gallbladder was  grasped by my assistant and retracted over the liver edge. Gentle  downward and lateral traction was applied to the infundibulum. There  was no fat over the common duct and it was easily visualized. She had  quite a long cystic duct. Cholangiography was performed with ICG green  dye and Firefly technology. There was no evidence of any obstruction. Cystic duct and artery were cleared from surrounding fatty areolar  tissue. They were clipped proximally and distally with Hem-o-rajan clips  and divided. The gallbladder was then dissected off the liver bed using  hook cautery technique. There was no bleeding from the liver bed. Once  this was completed, I scrubbed back into the patient's bedside. Instruments were removed. Robot was then undocked. There was no  bleeding seen from the liver bed. There was no evidence of bile leak. Gallbladder was placed in an EndoCatch type bag and removed from the  abdominal cavity. CO2 pneumoperitoneum was suctioned from the abdominal  cavity. Port sites were infiltrated with 0.5% Marcaine. All skin  incisions were closed in subcuticular fashion with Vicryl suture. Skin  glue was applied. The patient tolerated the procedure well, was  transported to the recovery area in stable condition.         Nando Polk M.D.    D: 04/27/2020 10:23:53       T: 04/27/2020 10:28:47     [id number]  Job#: [id number]     Doc#: [id number]    CC:  Ching Randle Siva Vela M.D. Geri Fairchild.

## 2020-04-28 ENCOUNTER — TELEPHONE (OUTPATIENT)
Dept: SURGERY | Age: 30
End: 2020-04-28

## 2020-05-11 ENCOUNTER — OFFICE VISIT (OUTPATIENT)
Dept: SURGERY | Age: 30
End: 2020-05-11

## 2020-05-11 VITALS
WEIGHT: 161 LBS | DIASTOLIC BLOOD PRESSURE: 70 MMHG | BODY MASS INDEX: 25.88 KG/M2 | OXYGEN SATURATION: 88 % | SYSTOLIC BLOOD PRESSURE: 114 MMHG | HEART RATE: 99 BPM | HEIGHT: 66 IN | TEMPERATURE: 97.2 F | RESPIRATION RATE: 16 BRPM

## 2020-05-11 PROCEDURE — 99024 POSTOP FOLLOW-UP VISIT: CPT | Performed by: SURGERY

## 2020-10-01 ENCOUNTER — HOSPITAL ENCOUNTER (OUTPATIENT)
Dept: CT IMAGING | Age: 30
Discharge: HOME | End: 2020-10-01
Payer: COMMERCIAL

## 2020-10-01 PROCEDURE — 74177 CT ABD & PELVIS W/CONTRAST: CPT

## 2020-10-01 PROCEDURE — 6360000004 HC RX CONTRAST MEDICATION: Performed by: NURSE PRACTITIONER

## 2020-10-01 RX ADMIN — IOHEXOL 50 ML: 240 INJECTION, SOLUTION INTRATHECAL; INTRAVASCULAR; INTRAVENOUS; ORAL at 17:27

## 2020-10-01 RX ADMIN — IOPAMIDOL 100 ML: 755 INJECTION, SOLUTION INTRAVENOUS at 17:35

## 2021-04-14 ENCOUNTER — HOSPITAL ENCOUNTER (OUTPATIENT)
Dept: GENERAL RADIOLOGY | Age: 31
Discharge: HOME | End: 2021-04-14
Payer: COMMERCIAL

## 2021-04-14 DIAGNOSIS — R00.2 PALPITATIONS: ICD-10-CM

## 2021-04-14 PROCEDURE — 93225 XTRNL ECG REC<48 HRS REC: CPT

## 2021-04-14 PROCEDURE — 93226 XTRNL ECG REC<48 HR SCAN A/R: CPT

## 2021-04-21 LAB
ACQUISITION DURATION: NORMAL S
AVERAGE HEART RATE: 103 BPM
HOOKUP DATE: NORMAL
HOOKUP TIME: NORMAL
MAX HEART RATE TIME/DATE: NORMAL
MAX HEART RATE: 161 BPM
MIN HEART RATE TIME/DATE: NORMAL
MIN HEART RATE: 78 BPM
NUMBER OF QRS COMPLEXES: NORMAL
NUMBER OF SUPRAVENTRICULAR COUPLETS: 0
NUMBER OF SUPRAVENTRICULAR ECTOPICS: 15
NUMBER OF SUPRAVENTRICULAR ISOLATED BEATS: 15
NUMBER OF VENTRICULAR BIGEMINAL CYCLES: 0
NUMBER OF VENTRICULAR COUPLETS: 0
NUMBER OF VENTRICULAR ECTOPICS: 2

## 2021-05-19 ENCOUNTER — HOSPITAL ENCOUNTER (OUTPATIENT)
Dept: PHYSICAL THERAPY | Age: 31
Setting detail: THERAPIES SERIES
Discharge: HOME | End: 2021-05-19
Payer: COMMERCIAL

## 2021-05-19 PROCEDURE — 97140 MANUAL THERAPY 1/> REGIONS: CPT

## 2021-05-19 PROCEDURE — 97162 PT EVAL MOD COMPLEX 30 MIN: CPT

## 2021-05-19 PROCEDURE — 97110 THERAPEUTIC EXERCISES: CPT

## 2021-05-19 NOTE — PROGRESS NOTES
** PLEASE SIGN, DATE AND TIME CERTIFICATION BELOW AND RETURN TO [facility] OUTPATIENT REHABILITATION (FAX #: [phone]). ATTEST/CO-SIGN IF ACCESSING VIA [employer]. THANK YOU.**    I certify that I have examined the patient below and determined that Physical Medicine and Rehabilitation service is necessary and that I approve the established plan of care for up to 90 days or as specifically noted. Attestation, signature or co-signature of physician indicates approval of certification requirements.    ________________________ ____________ __________  Physician Signature   Date   Time  [facility]  PHYSICAL THERAPY  OUTPATIENT REHABILITATION - SPECIALIZED THERAPY SERVICES  [x] PELVIC HEALTH EVALUATION  [] DAILY NOTE  [] PROGRESS NOTE [] DISCHARGE NOTE    Date: 2021  Patient Name:  Khris Camara  : 1990  MRN: [id number]  CSN: [id number]    Referring Practitioner Liam Rios MD   Diagnosis Pelvic and perineal pain [R10.2]  Supervision of other high risk pregnancies, third trimester [O09.893]    Treatment Diagnosis M62.89 - Other Specified Disorders of Muscle  M62.81 - Muscle Weakness (Generalized) and R10.2 - Pelvic and Perineal Pain  R10.30 - Lower Abdominal Pain, Unspecified   Date of Evaluation 21    Additional Pertinent History Infertility, female        took 1 round of clomid    PONV (postoperative nausea and vomiting)      Prolonged emergence from general anesthesia       CHOLECYSTECTOMY, LAPAROSCOPIC N/A 2020     CHOLECYSTECTOMY LAPAROSCOPIC ROBOTIC, performed by Gena Almanzar MD at [address] [address]   2018    WISDOM TOOTH EXTRACTION               Functional Outcome Measure Used PPUF   Functional Outcome Score 10, pt scored 3 in all areas that regard pelvic pain indicating high level of severity.    (21)       Insurance: Primary: Payor: Vance Parker /  /  / ,   Secondary:    Authorization Information: PRE CERTIFICATION REQUIRED: yes - After initial gasper through [employer] [id number]  INSURANCE THERAPY BENEFIT:  20 visits per lacie year zero used   Visit # 1, 1/10 for progress note   Visits Allowed: Hugo Avendaño sent 8/52/13 to clerical   Recertification Date:    Physician Follow-Up:    Physician Orders:    History of Present Illness: The pt presents to therapy with complaints of pain that began with pregnancy that began about three months ago. It has been tolerable up until this past Monday with pain that felt like \"giving birth\". She states pain is in the vaginal area. She was unable to tolerate the speculum for this examination. MD states that all is well with the pregnancy. Pain is better today. Pt states pain is worst at night when she lays down. She also notices that when she lifts her leg to put on her underwear she has increased pain in this area. The pt is 31 weeks pregnant with her second child, a boy. Baby is due July 18th. SUBJECTIVE: See above. Social/Functional History and Current Status:  Medications and Allergies have been reviewed and are listed on Medical History Questionnaire. Kimberly Camacho lives with spouse in a multiple floor home with stairs and a handrail to enter. 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 Pt finds herself standing and interrupting her work because of her pain. She is awakened by pain and is not getting her rest.       PAIN    Location Vaginal pain, pt states it feels like it is inside of her. Description Pt reports a burning, stretch, tension feeling. Increased by Laying down at night, sitting. Decreased by Standing, but it takes awhile.      Maximum Intensity 10/10   Best Intensity 4/10   Todays Rating 4/10   Other/Function Limits comfort with caring for other child and completing job as a . She is unable to rest due to pain and is awakened frequently        History of Abuse:NA    SEXUAL /MENSTRUAL HISTORY [] Deferred secondary to:    Age of First Menstrual Period    Are Cycles Regular    Pain During Menses    Birth Control    Number of Pregnancies 2, pt currently pregnant with second child. Number of Vaginal Deliveries 1, pt did tear. Number of Caesarean Deliveries        BLADDER ASSESSMENT [] Deferred secondary to: NA, pt denies   Daily Fluid Ingestion:    Urination Frequency    Volume    Urge Sensation    SYMPTOM QUESTIONNAIRE   Loses Urine Upon:    Incontinence Volume:    Frequency of Leakage:    Wets the Bed:    Burning/Pain with Urination:    Difficulty Starting a Stream of Urine:    Incomplete Emptying    Strain to Empty Bladder:    Falling Out Feeling:    Urinate more than 7 times/day:    Use a form of Leakage Protection:    Restrict Fluid Intake:    Stream Strength        BOWEL ASSESSMENT [] Deferred secondary to:  Pt denies issue. Frequency:    Most Common Stool Consistency:    SYMPTOM QUESTIONNAIRE   Strain to have a BM:    Include fiber in your diet:    Take laxatives/enemas regularly:    Pain with BM:    Strong urge to have BM:    Leak/Stain Feces:    Diarrhea often:            GENERAL ASSESSMENT   [] Deferred secondary to:   Palpation Pt is tender in the origins of the adductors, she also exhibits tenderness through the lower hip external rotators and upper hamstrings (origin). She reports that in sitting when up on ischium, her vaginal area does feel numb possibly indicating involvement of pudendal nerve. Pubic symphysis was non-tender, inguinal ligaments were tender B. Hip flexors were non-tender. Observation Pregnant pt of a healthy appearance, protuberant abdomen as expected with baby resting in a higher position.    Posture Surprisingly SI is level and pubic symphysis is even. Range of Motion Pt demonstrates limited hip girdle ROM by 50%, abduction (butterfly) provokes vaginal pain. Strength LE strength WFL   Gait Typical waddle   Sensation Intact, hypersensitive upper adductors   Edema None   Balance/Fall History NA   Special Tests Moderately limited hip girdle flexibility with abduction passively provoking pain. PELVIC FLOOR INTERNAL EXAM [] Deferred secondary to: Pt is pregnant, may do external exam in future pending results of treatment. Pt does not feel that tear scar is a contributor, states it was a very minor one. Exam    Sensation    Muscle Localization    Palpation/Tone    Pelvic Floor Strength    Relax after Contraction    Prolapse            TREATMENT   Precautions: Pt is 31 weeks pregnant. X in shaded column indicates activity completed today   Modalities Parameters/  Location  Notes                     Manual Therapy Time/Technique  Notes   Manual therapy to adductors and ischial regaion 10' B (5' each side) x                Exercise/Intervention    Notes   Pelvic tilt  10 x Pt supine but head elevated and wedge placed over elevated bed    Quadruped pelvic tilt  10 x           Kegel slow  4 hours x  10 times   Band  blue x 10 times. Abdominal brace with:       Hip ER B       Seated march (ball?)       Seated LAQ              Pelvic tilt mini squat       Pelvic tilt march       Pelvic tilt 3 way hip                            Mckay stretch                     SI belt  2' x Discussed   No asymetrical posture, headlights on motion. 5' x      Specific Interventions Next Treatment: Continue manual therapy as helpful, progress strengthening. Stretching as needed.       Activity/Treatment Tolerance:  [x]  Patient tolerated treatment well  []  Patient limited by fatigue  []  Patient limited by pain   []  Patient limited by medical complications  []  Other:     Patient Education:   [x] HEP/Education Completed: Pt to avoid asymmetrical posture. She will work on keeping the ilium pointed at [employer] pivoting or twisting.  Minka Access Code:  []  No new Education completed  []  Reviewed Prior HEP      []  Patient verbalized and/or demonstrated understanding of education provided. []  Patient unable to verbalize and/or demonstrate understanding of education provided. Will continue education. []  Barriers to learning:    Assessment: This unfortunate pt has started to have severe vaginal pain that has worsened with pregnancy. She is finding it difficulty to complete work tasks without being interrupted by her pain. She is also finding in challenging to care for her [de-identified] year old boy due to this pain. Lastly, she is unable to sleep at night, being frequently awakened by pain at this time when rest is crucial to the health of herself and her pregnancy. Upon examination she demonstrates pain and tenderness in the adductor origins, the nerves and soft tissue surrounding the ischium B with R worse than L, weakness in the abdomen promoted by pregnancy, and loss of hip girdle flexibility. All of these factors combine to add strain to the pelvic area that is worsened by the laxity resulting from pregnancy making this pain more likely to manifest when she rests and lowers her protective muscle tone. She requires skilled PT to address these dysfunctions in order to promote healing, pain relief, and the ability to rest at night, care for her son, and to complete work tasks in a timely manner. Body Structures/Functions/Activity Limitations: Abdominal and core weakness, Impaired activity tolerance, Impaired endurance, Impaired strength and Pain  Prognosis: Good    GOALS:  Patient Goal: To reduce pain with pregnancy. Short Term Goals:  Time Frame: 6 weeks  *  Patient to be independent with basic HEP.   * Patient to demonstrate and verbalize good knowledge of safe lifting, pushing, pulling strategies that limit pelvic organ stress. 3.  Pt will report pain decreased to 6/10 at worst consistently to allow the pt to rest easier and attend to work tasks with increased ease    4. Pt will demo full hip girdle ROM to enable her to assume comfortable positions for resting with greater ease. Long Term Goals:  Time Frame: 12 weeks  *  Patient to be independent with progressed HEP. 2.  Pt will report pain 4/10 max and present less than 50% of the time in order to promote full return to function. 3.  Pt will demo functional core strength in standing in order to support her pregnancy and reduce strain on noncontractile pain generators. 4.  Pt will report that she can sleep through the night 75% of the time in order to support her health and that of her pregnancy. PLAN:  Treatment Recommendations: Strengthening, Range of Motion, Functional Mobility Training, Transfer Training, Gait Training, Neuromuscular Re-education, Manual Therapy - Soft Tissue Mobilization, Manual Therapy - Joint Manipulation, Home Exercise Program, Patient Education, Self-Care Education and Training and Modalities    [x]  Plan of care initiated. Plan to see patient 1 times per week for 12 weeks to address the treatment planned outlined above.   []  Continue with current plan of care  []  Modify plan of care as follows:    []  Hold pending physician visit  []  Discharge    Time In 800   Time Out 900   Timed Code Minutes: 25   Total Treatment Time: 60       Electronically Signed by: Ileana James PT

## 2021-06-01 ENCOUNTER — HOSPITAL ENCOUNTER (OUTPATIENT)
Dept: PHYSICAL THERAPY | Age: 31
Setting detail: THERAPIES SERIES
Discharge: HOME | End: 2021-06-01
Payer: COMMERCIAL

## 2021-06-01 PROCEDURE — 97110 THERAPEUTIC EXERCISES: CPT

## 2021-06-01 PROCEDURE — 97140 MANUAL THERAPY 1/> REGIONS: CPT

## 2021-06-01 NOTE — PROGRESS NOTES
[facility]  PHYSICAL THERAPY  OUTPATIENT REHABILITATION - SPECIALIZED THERAPY SERVICES  [] PELVIC HEALTH EVALUATION  [x] DAILY NOTE  [] PROGRESS NOTE [] DISCHARGE NOTE    Date: 2021  Patient Name:  Anna Duff  : 1990  MRN: [id number]  CSN: [id number]    Referring Practitioner Kuldeep Kumar MD   Diagnosis Pelvic and perineal pain [R10.2]  Supervision of other high risk pregnancies, third trimester [O09.893]    Treatment Diagnosis M62.89 - Other Specified Disorders of Muscle  M62.81 - Muscle Weakness (Generalized) and R10.2 - Pelvic and Perineal Pain  R10.30 - Lower Abdominal Pain, Unspecified   Date of Evaluation 21    Additional Pertinent History Infertility, female        took 1 round of clomid    PONV (postoperative nausea and vomiting)      Prolonged emergence from general anesthesia       CHOLECYSTECTOMY, LAPAROSCOPIC N/A 2020     CHOLECYSTECTOMY LAPAROSCOPIC ROBOTIC, performed by Gisselle Brooke MD at [facility]   2018    WISDOM TOOTH EXTRACTION               Functional Outcome Measure Used PPUF   Functional Outcome Score 10, pt scored 3 in all areas that regard pelvic pain indicating high level of severity. (21)       Insurance: Primary: Payor: Radha Alva /  /  / ,   Secondary:    Authorization Information: PRE CERTIFICATION REQUIRED: yes - After initial eval through [facility] [id number]  INSURANCE THERAPY BENEFIT:  20 visits per lacie year zero used  (pt approved for 5)   Visit # 2, 2/10 for progress note   Visits Allowed: Vanda Carballo sent 4/71/15 to clerical   Recertification Date:    Physician Follow-Up:    Physician Orders:    History of Present Illness: The pt presents to therapy with complaints of pain that began with pregnancy that began about three months ago. It has been tolerable up until this past Monday with pain that felt like \"giving birth\". She states pain is in the vaginal area.   She was unable to tolerate the speculum for this Intact, hypersensitive upper adductors   Edema None   Balance/Fall History NA   Special Tests Moderately limited hip girdle flexibility with abduction passively provoking pain. PELVIC FLOOR INTERNAL EXAM [] Deferred secondary to: Pt is pregnant, may do external exam in future pending results of treatment. Pt does not feel that tear scar is a contributor, states it was a very minor one. Exam    Sensation    Muscle Localization    Palpation/Tone    Pelvic Floor Strength    Relax after Contraction    Prolapse            TREATMENT   Precautions: Pt is 31 weeks pregnant. X in shaded column indicates activity completed today   Modalities Parameters/  Location  Notes                     Manual Therapy Time/Technique  Notes   Manual therapy to adductors and ischial regaion 10' B (5' each side) x                Exercise/Intervention    Notes   Pelvic tilt  10  Hold due to pt is far along in pregnancy. Quadruped pelvic tilt  10 x 4-5\" x           Kegel slow  4 hours   10 times   Band  blue x 10 times    Abdominal brace with:       Hip adduction with ball  10 x    Seated march (ball?)  10 x    Seated LAQ       Seated combo                                          Pelvic tilt mini squat       Pelvic tilt march       Pelvic tilt 3 way hip                                                        Seated adductor stretch  3 x 30\" x    Mckay stretch standing  30\" x3 x Green step   Seated hamstring       Seated piriformis  3 x 30\" x                  Arm extension  10 x    Knee bent 25% hip extension  10 x    Fire hydrant                            SI belt  2'  Discussed   No asymetrical posture, headlights on motion. 5' x Review and applied to all exercises. Specific Interventions Next Treatment: Continue manual therapy as helpful, progress strengthening. Stretching as needed.       Activity/Treatment Tolerance:  [x]  Patient tolerated treatment well  []  Patient limited by fatigue  []  Patient limited by pain   []  Patient limited by medical complications  []  Other:     Patient Education:   [x]  HEP/Education Completed: Pt to avoid asymmetrical posture. She will work on keeping the ilium pointed at [employer] pivoting or twisting.  [employer] Access Code:  []  No new Education completed  []  Reviewed Prior HEP      [x]  Patient verbalized and/or demonstrated understanding of education provided. []  Patient unable to verbalize and/or demonstrate understanding of education provided. Will continue education. []  Barriers to learning:    Assessment: This pt reports less frequent pain indicating effective direction of therapy. Pt progressed in strengthening of her core and working on activation with all LE motions to avoid pelvic strain. Pt now sitting for all of exercises as she can no longer lay with [facility] elevated/supine with comfort. Ended with manual therapy with pt noting discomfort in areas that corresponded to taut tissue. Pt felt better after session with reduced overall discomfort. Will continue skilled PT in order to strengthen the pt promoting reduction in pain from soft tissue strain. Anticipate need to modify exercises as pt pregnancy progresses. Body Structures/Functions/Activity Limitations: Abdominal and core weakness, Impaired activity tolerance, Impaired endurance, Impaired strength and Pain  Prognosis: Good    GOALS:  Patient Goal: To reduce pain with pregnancy. Short Term Goals:  Time Frame: 6 weeks  *  Patient to be independent with basic HEP. * Patient to demonstrate and verbalize good knowledge of safe lifting, pushing, pulling strategies that limit pelvic organ stress. 3.  Pt will report pain decreased to 6/10 at worst consistently to allow the pt to rest easier and attend to work tasks with increased ease    4. Pt will demo full hip girdle ROM to enable her to assume comfortable positions for resting with greater ease.         Long Term Goals:  Time Frame: 12

## 2021-06-09 ENCOUNTER — HOSPITAL ENCOUNTER (OUTPATIENT)
Dept: PHYSICAL THERAPY | Age: 31
Setting detail: THERAPIES SERIES
End: 2021-06-09
Payer: COMMERCIAL

## 2021-06-16 ENCOUNTER — HOSPITAL ENCOUNTER (OUTPATIENT)
Dept: PHYSICAL THERAPY | Age: 31
Setting detail: THERAPIES SERIES
Discharge: HOME | End: 2021-06-16
Payer: COMMERCIAL

## 2021-06-16 PROCEDURE — 97140 MANUAL THERAPY 1/> REGIONS: CPT

## 2021-06-16 PROCEDURE — 97530 THERAPEUTIC ACTIVITIES: CPT

## 2021-06-16 NOTE — PROGRESS NOTES
** PLEASE SIGN, DATE AND TIME CERTIFICATION BELOW AND RETURN TO [facility] OUTPATIENT REHABILITATION (FAX #: [phone]). ATTEST/CO-SIGN IF ACCESSING VIA [employer]. THANK YOU.**    I certify that I have examined the patient below and determined that Physical Medicine and Rehabilitation service is necessary and that I approve the established plan of care for up to 90 days or as specifically noted. Attestation, signature or co-signature of physician indicates approval of certification requirements.    ________________________ ____________ __________  Physician Signature   Date   Time      [facility]  PHYSICAL THERAPY  OUTPATIENT REHABILITATION - SPECIALIZED THERAPY SERVICES  [] PELVIC HEALTH EVALUATION  [] DAILY NOTE  [x] PROGRESS NOTE [] DISCHARGE NOTE    Date: 2021  Patient Name:  Zuleyma Pennington  : 1990  MRN: [id number]  CSN: [id number]    Referring Practitioner Steph Colorado MD   Diagnosis Pelvic and perineal pain [R10.2]  Supervision of other high risk pregnancies, third trimester [O09.893]    Treatment Diagnosis M62.89 - Other Specified Disorders of Muscle  M62.81 - Muscle Weakness (Generalized) and R10.2 - Pelvic and Perineal Pain  R10.30 - Lower Abdominal Pain, Unspecified   Date of Evaluation 21    Additional Pertinent History Infertility, female        took 1 round of clomid    PONV (postoperative nausea and vomiting)      Prolonged emergence from general anesthesia       CHOLECYSTECTOMY, LAPAROSCOPIC N/A 2020     CHOLECYSTECTOMY LAPAROSCOPIC ROBOTIC, performed by Waqas Morales MD at [facility]   2018    WISDOM TOOTH EXTRACTION               Functional Outcome Measure Used PPUF   Functional Outcome Score 10, pt scored 3 in all areas that regard pelvic pain indicating high level of severity.    (21)       Insurance: Primary: Payor: Jovon Bach /  /  / ,   Secondary:    Authorization Information: PRE CERTIFICATION REQUIRED: yes - After initial eval through [employer] [id number]  INSURANCE THERAPY BENEFIT:  20 visits per lacie year zero used  (pt approved for 5)   Visit # 3, 3/10 for progress note   Visits Allowed:    Recertification Date:    Physician Follow-Up:    Physician Orders:    History of Present Illness: The pt presents to therapy with complaints of pain that began with pregnancy that began about three months ago. It has been tolerable up until this past Monday with pain that felt like \"giving birth\". She states pain is in the vaginal area. She was unable to tolerate the speculum for this examination. MD states that all is well with the pregnancy. Pain is better today. Pt states pain is worst at night when she lays down. She also notices that when she lifts her leg to put on her underwear she has increased pain in this area. The pt is 31 weeks pregnant with her second child, a boy. Baby is due July 18th. SUBJECTIVE: The pt presents SOB following climb up the steps. She states she is okay and that she is not in any actual distress when PT expresses concern. She has new onset pain in the R inguinal, ASIS, and upper buttock pain and states that this is currently worse than the vaginal pain. She now has days when her vaginal pain is gone, it still does occur but this is now less often and less intense. Pt feels that this pain is now normal and manageable. Pt is now 35 weeks pregnant. R inguinal pain is now pt's primary concern. She woke up with this pain one day over the weekend. She takes tylenol and tries to stretch but it is not very helpful. Pain increases during sleep preventing pt from getting good rest.  She states it hurts all of the time and that at times it is so tight it spasms. No numbness or tingling in the leg. No change with bowel and bladder no worse with cough and sneeze.        Social/Functional History and Current Status:  Medications and Allergies have been reviewed and are listed on Medical History level   Range of Motion Spinal ROM not limited beyond expectation of late stage pregnancy   Strength Core very week as pregnancy has caused abdomen to protrude. Gait Slow t[place], waddle with mild antalgia on the R   Sensation Intact   Edema None   Balance/Fall History WNL   Special Tests Pregnancy prevents test postures,  Gentle anterior force on the R ilium relieves discomfort             PELVIC FLOOR INTERNAL EXAM [] Deferred secondary to: Pt is pregnant, may do external exam in future pending results of treatment. Pt does not feel that tear scar is a contributor, states it was a very minor one. Exam    Sensation    Muscle Localization    Palpation/Tone    Pelvic Floor Strength    Relax after Contraction    Prolapse            TREATMENT   Precautions: Pt is 31 weeks pregnant. X in shaded column indicates activity completed today   Modalities Parameters/  Location  Notes                     Manual Therapy Time/Technique  Notes   Manual therapy to adductors and ischial regaion 10' B (5' each side)     Manual therapy/MFR to the R inguinal area, iliac, obliques, hip, and buttock to the ischium. 25' x Gentle SI mobs into anterior innonimate rotation throughout session         Exercise/Intervention    Notes   Pelvic tilt  10  Hold due to pt is far along in pregnancy.      Quadruped pelvic tilt  10 x 4-5\"            Kegel slow  4 hours   10 times   Band  blue  10 times    Abdominal brace with:       Hip adduction with ball  10     Seated march (ball?)  10     Seated LAQ       Seated combo                                          Pelvic tilt mini squat       Pelvic tilt march       Pelvic tilt 3 way hip       Monster walk                                                 Seated adductor stretch  3 x 30\"     Mckay stretch standing  30\" x3  Green step   Seated hamstring       Seated piriformis  3 x 30\"                   Arm extension  10     Knee bent 25% hip extension  10     Fire hydrant       Opposites SI belt  2'  Discussed   No asymetrical posture, headlights on motion. 1' x  Reviewed concept     Specific Interventions Next Treatment: Continue manual therapy as helpful, progress strengthening. Stretching as needed. Activity/Treatment Tolerance:  [x]  Patient tolerated treatment well  []  Patient limited by fatigue  []  Patient limited by pain   []  Patient limited by medical complications  []  Other:     Patient Education:   [x]  HEP/Education Completed: Pt to avoid asymmetrical posture. She will work on keeping the ilium pointed at [employer] pivoting or twisting. She was educated not to bend over and not to activate the hamstrings if she could help it to maintain results of today's treatment.  [email or web address] Access Code:  []  No new Education completed  []  Reviewed Prior HEP      [x]  Patient verbalized and/or demonstrated understanding of education provided. []  Patient unable to verbalize and/or demonstrate understanding of education provided. Will continue education. []  Barriers to learning:    Assessment: This pt is very pleased with the near resolution of vaginal pain. She feels that this is no longer an issue. At present, inguinal/iliac pain, that was very mild at evaluation, is now worsened into her main concern as it is preventing sleep. The pt is very drowsy today and reports that she is exhausted due to not sleeping at night and working all day. She noticed and increase in pain upon waking one morning over the weekend and is now unable to rest due to the intensity of the pain. Pt re-examined today as the focus of treatment needs to shift from vaginal pain to previously mild inguinal/iliac/LB pain. Upon examination PT could see that the pt's abdomen has begun to protrude much further anteriorly than a few weeks ago. Pt is 35 weeks and she reports she is measuring at 37 weeks. She demonstrates SI grossly aligned though inspection is not easy due to pregnancy. She did find pressure on ilium posteriorly in an anterior direction to be relieving of her pain. She demonstrates loss of soft tissue mobility in the external obliques, inguinal ligament, hip external rotators and even into the soft tissue of the SI region as they have grown taut with the expansion of pt's abdomen. PT addressed these issues with MFR in regions described above and trigger point release at the ASIS. The pt reported tremendous relief of symptoms stating, \"I can move now\". She rated pain at a 2/10 down from 5-6/10. She was educated in postures to enable her to maintain gains made today in therapy. She will be seen next week, and, if her pain is under better control, we will move on to strengthening in order to better support her growing abdomen for reduced risk of post-partum pain. Body Structures/Functions/Activity Limitations: Abdominal and core weakness, Impaired activity tolerance, Impaired endurance, Impaired strength and Pain  Prognosis: Good    GOALS:  Patient Goal: To reduce pain with pregnancy. Short Term Goals:  Time Frame: 6 weeks  *  Patient to be independent with basic HEP. * Patient to demonstrate and verbalize good knowledge of safe lifting, pushing, pulling strategies that limit pelvic organ stress. - MET pt demos  3. Pt will report pain decreased to 6/10 at worst consistently to allow the pt to rest easier and attend to work tasks with increased ease  -MET, vaginal pain is 4/10 at worst  4. Pt will demo full hip girdle ROM to enable her to assume comfortable positions for resting with greater ease.-Discontinue goal, pt will not attain this before  5. Pt will decrease inguinal, and iliac pain to 5/10 at worst to increase comfort with sleep. Long Term Goals:  Time Frame: 12 weeks  *  Patient to be independent with progressed HEP.    2.  Pt will report pain 4/10 max and present less than 50% of the time in order to promote full return to function.- MET, 4/10 max 30% for vaginal pain  3. Pt will demo functional core strength in standing in order to support her pregnancy and reduce strain on noncontractile pain generators.-Not met, in progress    4. Pt will report that she can sleep through the night 75% of the time in order to support her health and that of her pregnancy. -MET for vaginal pain, now awakening with piriformis pain. 5.  Pt will report max pain 4/10 in her inguinal/iliac/back region to allow improved sleep. PLAN:  Treatment Recommendations: Strengthening, Range of Motion, Functional Mobility Training, Transfer Training, Gait Training, Neuromuscular Re-education, Manual Therapy - Soft Tissue Mobilization, Manual Therapy - Joint Manipulation, Home Exercise Program, Patient Education, Self-Care Education and Training and Modalities    [x]  Plan of care initiated. Plan to see patient 1 times per week for 12 weeks to address the treatment planned outlined above.   []  Continue with current plan of care  []  Modify plan of care as follows:    []  Hold pending physician visit  []  Discharge    Time In 800   Time Out 900   Timed Code Minutes: 60   Total Treatment Time: 60       Electronically Signed by: Guilherme Phan, PT

## 2021-06-23 ENCOUNTER — HOSPITAL ENCOUNTER (OUTPATIENT)
Dept: PHYSICAL THERAPY | Age: 31
Setting detail: THERAPIES SERIES
Discharge: HOME | End: 2021-06-23
Payer: COMMERCIAL

## 2021-06-23 PROCEDURE — 97140 MANUAL THERAPY 1/> REGIONS: CPT

## 2021-06-23 PROCEDURE — 97530 THERAPEUTIC ACTIVITIES: CPT

## 2021-06-23 NOTE — PROGRESS NOTES
[facility]  PHYSICAL THERAPY  OUTPATIENT REHABILITATION - SPECIALIZED THERAPY SERVICES  [] PELVIC HEALTH EVALUATION  [x] DAILY NOTE  [] PROGRESS NOTE [] DISCHARGE NOTE    Date: 2021  Patient Name:  Mathew Ricks  : 1990  MRN: [id number]  CSN: [id number]    Referring Practitioner Rob Richards MD   Diagnosis Pelvic and perineal pain [R10.2]  Supervision of other high risk pregnancies, third trimester [O09.893]    Treatment Diagnosis M62.89 - Other Specified Disorders of Muscle  M62.81 - Muscle Weakness (Generalized) and R10.2 - Pelvic and Perineal Pain  R10.30 - Lower Abdominal Pain, Unspecified   Date of Evaluation 21    Additional Pertinent History Infertility, female        took 1 round of clomid    PONV (postoperative nausea and vomiting)      Prolonged emergence from general anesthesia       CHOLECYSTECTOMY, LAPAROSCOPIC N/A 2020     CHOLECYSTECTOM[facility]    WISDOM TOOTH EXTRACTION               Functional Outcome Measure Used PPUF   Functional Outcome Score 10, pt scored 3 in all areas that regard pelvic pain indicating high level of severity. (21)       Insurance: Primary: Payor: Shahbaz Israel 150 /  /  / ,   Secondary:    Authorization Information: PRE CERTIFICATION REQUIRED: yes - After initial eval through [facility] [id number]  INSURANCE THERAPY BENEFIT:  20 visits per lacie year zero used  (pt approved for 5)   Visit # 4, 4/10 for progress note   Visits Allowed:    Recertification Date:    Physician Follow-Up:    Physician Orders:    History of Present Illness: The pt presents to therapy with complaints of pain that began with pregnancy that began about three months ago. It has been tolerable up until this past Monday with pain that felt like \"giving birth\". She states pain is in the vaginal area. She was unable to tolerate the speculum for this examination.   MD states that all is well with the pregnancy. Pain is better today. Pt states pain is worst at night when she lays down. She also notices that when she lifts her leg to put on her underwear she has increased pain in this area. The pt is 31 weeks pregnant with her second child, a boy. Baby is due July 18th. SUBJECTIVE: The pt states that the hip pain has resolved. However, she is now experiencing a return of vaginal pain. She notices that the last two days her pain has been worse. It is worse with changing positions. For example, it is bad if she gets up from sitting or awhile but then improves with walking. She concurs with PT that she is getting a spasm in pelvic floor with transfer of load from chair to the pelvic floor upon standing. The pt feels that she can continue on her own after today as she is set to deliver very soon. Social/Functional History and Current Status:  Medications and Allergies have been reviewed and are listed on Medical History Questionnaire. Angelina Gomez lives with spouse in a multiple floor home with stairs and a handrail to enter. 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 Pt finds herself standing and interrupting her work because of her pain. She is awakened by pain and is not getting her rest.       PAIN updated today for vaginal and inguinal/iliac pain   Location Inguinal pain that radiates up through the ilium and across the lower back   Description Today's pain:  Spasm, \"fei horse\"   Increased by Laying down at night, but present all the time. Decreased by Nothing is helping inguinal pain.      Maximum Intensity 10/10 Manual therapy to adductors and ischial regaion 25' B (10' each side) x    Manual therapy/MFR to the R inguinal area, iliac, obliques, hip, and buttock to the ischium. 25'  Gentle SI mobs into anterior innonimate rotation throughout session         Exercise/Intervention    Notes   Pelvic tilt  10  Hold due to pt is far along in pregnancy. Quadruped pelvic tilt  10 x 4-5\"            Kegel slow  4 hours   10 times   Band  blue  10 times    Abdominal brace with:       Hip adduction with ball  10     Seated march (ball?)  10     Seated LAQ       Seated combo                     Pelvic brace for sit to stand  10' x Practice, handout given                 Pelvic tilt mini squat       Pelvic tilt march       Pelvic tilt 3 way hip       Monster walk                                                 Seated adductor stretch  3 x 30\"     Mckay stretch standing  30\" x3  Green step   Seated hamstring       Seated piriformis  3 x 30\"                   Arm extension  10     Knee bent 25% hip extension  10     Fire hydrant       Opposites                     SI belt  2'  Discussed   No asymetrical posture, headlights on motion. 1'   Reviewed concept     Specific Interventions Next Treatment: Continue manual therapy as helpful, progress strengthening. Stretching as needed. Activity/Treatment Tolerance:  [x]  Patient tolerated treatment well  []  Patient limited by fatigue  []  Patient limited by pain   []  Patient limited by medical complications  []  Other:     Patient Education:   [x]  HEP/Education Completed: Pt to avoid asymmetrical posture. She will work on keeping the ilium pointed at [employer] pivoting or twisting. She was educated not to bend over and not to activate the hamstrings if she could help it to maintain results of today's treatment.       [employer] Access Code:  []  No new Education completed  []  Reviewed Prior HEP      [x]  Patient verbalized and/or demonstrated understanding of education standing in order to support her pregnancy and reduce strain on noncontractile pain generators.-Not met, in progress    4. Pt will report that she can sleep through the night 75% of the time in order to support her health and that of her pregnancy. -MET for vaginal pain, now awakening with piriformis pain. 5.  Pt will report max pain 4/10 in her inguinal/iliac/back region to allow improved sleep. PLAN:  Treatment Recommendations: Strengthening, Range of Motion, Functional Mobility Training, Transfer Training, Gait Training, Neuromuscular Re-education, Manual Therapy - Soft Tissue Mobilization, Manual Therapy - Joint Manipulation, Home Exercise Program, Patient Education, Self-Care Education and Training and Modalities    []  Plan of care initiated. Plan to see patient 1 times per week for 12 weeks to address the treatment planned outlined above. []  Continue with current plan of care  []  Modify plan of care as follows:    [x]  Hold pending need up to delivery.   []  Discharge    Time In 800   Time Out 945   Timed Code Minutes: 45   Total Treatment Time: 45       Electronically Signed by: Guilherme Phan PT

## 2021-06-30 ENCOUNTER — APPOINTMENT (OUTPATIENT)
Dept: PHYSICAL THERAPY | Age: 31
End: 2021-06-30
Payer: COMMERCIAL

## 2021-07-01 ENCOUNTER — HOSPITAL ENCOUNTER (INPATIENT)
Age: 31
LOS: 2 days | Discharge: HOME | End: 2021-07-03
Attending: OBSTETRICS & GYNECOLOGY | Admitting: OBSTETRICS & GYNECOLOGY
Payer: COMMERCIAL

## 2021-07-01 LAB
ABO: NORMAL
AMPHETAMINE+METHAMPHETAMINE URINE SCREEN: NEGATIVE
ANTIBODY SCREEN: NORMAL
BARBITURATE QUANTITATIVE URINE: NEGATIVE
BASOPHILS # BLD: 0.4 %
BASOPHILS ABSOLUTE: 0 THOU/MM3 (ref 0–0.1)
BENZODIAZEPINE QUANTITATIVE URINE: NEGATIVE
CANNABINOID QUANTITATIVE URINE: NEGATIVE
COCAINE METABOLITE QUANTITATIVE URINE: NEGATIVE
EOSINOPHIL # BLD: 1.4 %
EOSINOPHILS ABSOLUTE: 0.1 THOU/MM3 (ref 0–0.4)
ERYTHROCYTE [DISTWIDTH] IN BLOOD BY AUTOMATED COUNT: 14.3 % (ref 11.5–14.5)
ERYTHROCYTE [DISTWIDTH] IN BLOOD BY AUTOMATED COUNT: 41.7 FL (ref 35–45)
HCT VFR BLD CALC: 28.5 % (ref 37–47)
HEMOGLOBIN: 8.8 GM/DL (ref 12–16)
IMMATURE GRANS (ABS): 0.05 THOU/MM3 (ref 0–0.07)
IMMATURE GRANULOCYTES: 0.6 %
LYMPHOCYTES # BLD: 17.9 %
LYMPHOCYTES ABSOLUTE: 1.5 THOU/MM3 (ref 1–4.8)
MCH RBC QN AUTO: 25 PG (ref 26–33)
MCHC RBC AUTO-ENTMCNC: 30.9 GM/DL (ref 32.2–35.5)
MCV RBC AUTO: 81 FL (ref 81–99)
MONOCYTES # BLD: 6.1 %
MONOCYTES ABSOLUTE: 0.5 THOU/MM3 (ref 0.4–1.3)
NUCLEATED RED BLOOD CELLS: 0 /100 WBC
OPIATES, URINE: NEGATIVE
OXYCODONE: NEGATIVE
PHENCYCLIDINE QUANTITATIVE URINE: NEGATIVE
PLATELET # BLD: 167 THOU/MM3 (ref 130–400)
PMV BLD AUTO: 10.7 FL (ref 9.4–12.4)
RBC # BLD: 3.52 MILL/MM3 (ref 4.2–5.4)
RH FACTOR: NORMAL
SEG NEUTROPHILS: 73.6 %
SEGMENTED NEUTROPHILS ABSOLUTE COUNT: 6.3 THOU/MM3 (ref 1.8–7.7)
WBC # BLD: 8.5 THOU/MM3 (ref 4.8–10.8)

## 2021-07-01 PROCEDURE — 1220000001 HC SEMI PRIVATE L&D R&B

## 2021-07-01 PROCEDURE — 86923 COMPATIBILITY TEST ELECTRIC: CPT

## 2021-07-01 PROCEDURE — 86850 RBC ANTIBODY SCREEN: CPT

## 2021-07-01 PROCEDURE — 86900 BLOOD TYPING SEROLOGIC ABO: CPT

## 2021-07-01 PROCEDURE — 6360000002 HC RX W HCPCS

## 2021-07-01 PROCEDURE — 80307 DRUG TEST PRSMV CHEM ANLYZR: CPT

## 2021-07-01 PROCEDURE — 86901 BLOOD TYPING SEROLOGIC RH(D): CPT

## 2021-07-01 PROCEDURE — 86592 SYPHILIS TEST NON-TREP QUAL: CPT

## 2021-07-01 PROCEDURE — 36415 COLL VENOUS BLD VENIPUNCTURE: CPT

## 2021-07-01 PROCEDURE — 85025 COMPLETE CBC W/AUTO DIFF WBC: CPT

## 2021-07-01 PROCEDURE — 2580000003 HC RX 258: Performed by: OBSTETRICS & GYNECOLOGY

## 2021-07-01 RX ORDER — METOPROLOL SUCCINATE 25 MG/1
25 TABLET, EXTENDED RELEASE ORAL NIGHTLY
Status: DISCONTINUED | OUTPATIENT
Start: 2021-07-01 | End: 2021-07-02

## 2021-07-01 RX ORDER — DIPHENHYDRAMINE HYDROCHLORIDE 50 MG/ML
25 INJECTION INTRAMUSCULAR; INTRAVENOUS EVERY 4 HOURS PRN
Status: DISCONTINUED | OUTPATIENT
Start: 2021-07-01 | End: 2021-07-02 | Stop reason: HOSPADM

## 2021-07-01 RX ORDER — SODIUM CHLORIDE, SODIUM LACTATE, POTASSIUM CHLORIDE, AND CALCIUM CHLORIDE .6; .31; .03; .02 G/100ML; G/100ML; G/100ML; G/100ML
1000 INJECTION, SOLUTION INTRAVENOUS PRN
Status: DISCONTINUED | OUTPATIENT
Start: 2021-07-01 | End: 2021-07-02 | Stop reason: HOSPADM

## 2021-07-01 RX ORDER — SODIUM CHLORIDE 0.9 % (FLUSH) 0.9 %
5-40 SYRINGE (ML) INJECTION EVERY 12 HOURS SCHEDULED
Status: DISCONTINUED | OUTPATIENT
Start: 2021-07-01 | End: 2021-07-02 | Stop reason: HOSPADM

## 2021-07-01 RX ORDER — LIDOCAINE HYDROCHLORIDE 10 MG/ML
30 INJECTION, SOLUTION EPIDURAL; INFILTRATION; INTRACAUDAL; PERINEURAL PRN
Status: DISCONTINUED | OUTPATIENT
Start: 2021-07-01 | End: 2021-07-02 | Stop reason: HOSPADM

## 2021-07-01 RX ORDER — ONDANSETRON 2 MG/ML
8 INJECTION INTRAMUSCULAR; INTRAVENOUS EVERY 6 HOURS PRN
Status: DISCONTINUED | OUTPATIENT
Start: 2021-07-01 | End: 2021-07-02 | Stop reason: HOSPADM

## 2021-07-01 RX ORDER — SEVOFLURANE 250 ML/250ML
1 LIQUID RESPIRATORY (INHALATION) CONTINUOUS PRN
Status: DISCONTINUED | OUTPATIENT
Start: 2021-07-01 | End: 2021-07-02 | Stop reason: HOSPADM

## 2021-07-01 RX ORDER — TERBUTALINE SULFATE 1 MG/ML
0.25 INJECTION, SOLUTION SUBCUTANEOUS
Status: ACTIVE | OUTPATIENT
Start: 2021-07-01 | End: 2021-07-01

## 2021-07-01 RX ORDER — SODIUM CHLORIDE, SODIUM LACTATE, POTASSIUM CHLORIDE, CALCIUM CHLORIDE 600; 310; 30; 20 MG/100ML; MG/100ML; MG/100ML; MG/100ML
INJECTION, SOLUTION INTRAVENOUS CONTINUOUS
Status: DISCONTINUED | OUTPATIENT
Start: 2021-07-01 | End: 2021-07-02

## 2021-07-01 RX ORDER — SODIUM CHLORIDE, SODIUM LACTATE, POTASSIUM CHLORIDE, AND CALCIUM CHLORIDE .6; .31; .03; .02 G/100ML; G/100ML; G/100ML; G/100ML
500 INJECTION, SOLUTION INTRAVENOUS PRN
Status: DISCONTINUED | OUTPATIENT
Start: 2021-07-01 | End: 2021-07-02 | Stop reason: HOSPADM

## 2021-07-01 RX ORDER — MORPHINE SULFATE 4 MG/ML
4 INJECTION, SOLUTION INTRAMUSCULAR; INTRAVENOUS
Status: DISCONTINUED | OUTPATIENT
Start: 2021-07-01 | End: 2021-07-02 | Stop reason: HOSPADM

## 2021-07-01 RX ORDER — ACETAMINOPHEN 500 MG
1000 TABLET ORAL EVERY 6 HOURS PRN
COMMUNITY

## 2021-07-01 RX ORDER — BUTORPHANOL TARTRATE 1 MG/ML
1 INJECTION, SOLUTION INTRAMUSCULAR; INTRAVENOUS
Status: DISCONTINUED | OUTPATIENT
Start: 2021-07-01 | End: 2021-07-02 | Stop reason: HOSPADM

## 2021-07-01 RX ORDER — MORPHINE SULFATE 2 MG/ML
2 INJECTION, SOLUTION INTRAMUSCULAR; INTRAVENOUS
Status: DISCONTINUED | OUTPATIENT
Start: 2021-07-01 | End: 2021-07-02 | Stop reason: HOSPADM

## 2021-07-01 RX ORDER — MISOPROSTOL 200 UG/1
1000 TABLET ORAL PRN
Status: DISCONTINUED | OUTPATIENT
Start: 2021-07-01 | End: 2021-07-02 | Stop reason: HOSPADM

## 2021-07-01 RX ORDER — SODIUM CHLORIDE 0.9 % (FLUSH) 0.9 %
5-40 SYRINGE (ML) INJECTION PRN
Status: DISCONTINUED | OUTPATIENT
Start: 2021-07-01 | End: 2021-07-02 | Stop reason: HOSPADM

## 2021-07-01 RX ORDER — SODIUM CHLORIDE 9 MG/ML
25 INJECTION, SOLUTION INTRAVENOUS PRN
Status: DISCONTINUED | OUTPATIENT
Start: 2021-07-01 | End: 2021-07-02 | Stop reason: HOSPADM

## 2021-07-01 RX ORDER — ACETAMINOPHEN 325 MG/1
650 TABLET ORAL EVERY 4 HOURS PRN
Status: DISCONTINUED | OUTPATIENT
Start: 2021-07-01 | End: 2021-07-02 | Stop reason: HOSPADM

## 2021-07-01 RX ORDER — METOPROLOL SUCCINATE 25 MG/1
25 TABLET, EXTENDED RELEASE ORAL DAILY
COMMUNITY

## 2021-07-01 RX ORDER — IBUPROFEN 800 MG/1
800 TABLET ORAL EVERY 8 HOURS PRN
Status: DISCONTINUED | OUTPATIENT
Start: 2021-07-01 | End: 2021-07-02 | Stop reason: HOSPADM

## 2021-07-01 RX ORDER — METHYLERGONOVINE MALEATE 0.2 MG/ML
200 INJECTION INTRAVENOUS PRN
Status: DISCONTINUED | OUTPATIENT
Start: 2021-07-01 | End: 2021-07-02 | Stop reason: HOSPADM

## 2021-07-01 RX ORDER — CARBOPROST TROMETHAMINE 250 UG/ML
250 INJECTION, SOLUTION INTRAMUSCULAR PRN
Status: DISCONTINUED | OUTPATIENT
Start: 2021-07-01 | End: 2021-07-02 | Stop reason: HOSPADM

## 2021-07-01 RX ADMIN — SODIUM CHLORIDE, POTASSIUM CHLORIDE, SODIUM LACTATE AND CALCIUM CHLORIDE: 600; 310; 30; 20 INJECTION, SOLUTION INTRAVENOUS at 19:00

## 2021-07-01 RX ADMIN — Medication 1 MILLI-UNITS/MIN: at 20:01

## 2021-07-01 RX ADMIN — SODIUM CHLORIDE, POTASSIUM CHLORIDE, SODIUM LACTATE AND CALCIUM CHLORIDE: 600; 310; 30; 20 INJECTION, SOLUTION INTRAVENOUS at 23:17

## 2021-07-01 ASSESSMENT — PAIN DESCRIPTION - DESCRIPTORS: DESCRIPTORS: CRAMPING

## 2021-07-01 NOTE — FLOWSHEET NOTE
Dr. Brisa Zamarripa called unit. Updated that this is pt of Dr Mag Martinez at 37+4 weeks, . Here for a scheduled induction. FHT's reactive. [facility] [place] showing ctx every 2-4 minutes. MD states to do SVE and orders to start pitocin if needed. Pt also takes 25mg of metoprolol in the evening for tachycardia during pregnancy. Dose ordered for this evening.

## 2021-07-02 ENCOUNTER — ANESTHESIA EVENT (OUTPATIENT)
Dept: LABOR AND DELIVERY | Age: 31
End: 2021-07-02
Payer: COMMERCIAL

## 2021-07-02 ENCOUNTER — ANESTHESIA (OUTPATIENT)
Dept: LABOR AND DELIVERY | Age: 31
End: 2021-07-02
Payer: COMMERCIAL

## 2021-07-02 LAB
APTT: 24.1 SECONDS (ref 22–38)
ERYTHROCYTE [DISTWIDTH] IN BLOOD BY AUTOMATED COUNT: 14.1 % (ref 11.5–14.5)
ERYTHROCYTE [DISTWIDTH] IN BLOOD BY AUTOMATED COUNT: 41.7 FL (ref 35–45)
FIBRINOGEN: 334 MG/100ML (ref 155–475)
HCT VFR BLD CALC: 29.1 % (ref 37–47)
HEMOGLOBIN: 8.7 GM/DL (ref 12–16)
INR BLD: 0.9 (ref 0.85–1.13)
MCH RBC QN AUTO: 24.4 PG (ref 26–33)
MCHC RBC AUTO-ENTMCNC: 29.9 GM/DL (ref 32.2–35.5)
MCV RBC AUTO: 81.5 FL (ref 81–99)
PLATELET # BLD: 153 THOU/MM3 (ref 130–400)
PMV BLD AUTO: 10 FL (ref 9.4–12.4)
RBC # BLD: 3.57 MILL/MM3 (ref 4.2–5.4)
RPR: NONREACTIVE
WBC # BLD: 8.8 THOU/MM3 (ref 4.8–10.8)

## 2021-07-02 PROCEDURE — 2500000003 HC RX 250 WO HCPCS: Performed by: ANESTHESIOLOGY

## 2021-07-02 PROCEDURE — 36415 COLL VENOUS BLD VENIPUNCTURE: CPT

## 2021-07-02 PROCEDURE — 1200000000 HC SEMI PRIVATE

## 2021-07-02 PROCEDURE — 85027 COMPLETE CBC AUTOMATED: CPT

## 2021-07-02 PROCEDURE — 6360000002 HC RX W HCPCS: Performed by: ANESTHESIOLOGY

## 2021-07-02 PROCEDURE — 6370000000 HC RX 637 (ALT 250 FOR IP): Performed by: OBSTETRICS & GYNECOLOGY

## 2021-07-02 PROCEDURE — 7200000001 HC VAGINAL DELIVERY

## 2021-07-02 PROCEDURE — 85730 THROMBOPLASTIN TIME PARTIAL: CPT

## 2021-07-02 PROCEDURE — 88307 TISSUE EXAM BY PATHOLOGIST: CPT

## 2021-07-02 PROCEDURE — 3700000025 EPIDURAL BLOCK: Performed by: ANESTHESIOLOGY

## 2021-07-02 PROCEDURE — 85610 PROTHROMBIN TIME: CPT

## 2021-07-02 PROCEDURE — 0UQMXZZ REPAIR VULVA, EXTERNAL APPROACH: ICD-10-PCS | Performed by: OBSTETRICS & GYNECOLOGY

## 2021-07-02 PROCEDURE — 85385 FIBRINOGEN ANTIGEN: CPT

## 2021-07-02 PROCEDURE — 2580000003 HC RX 258: Performed by: OBSTETRICS & GYNECOLOGY

## 2021-07-02 RX ORDER — ROPIVACAINE HYDROCHLORIDE 2 MG/ML
INJECTION, SOLUTION EPIDURAL; INFILTRATION; PERINEURAL
Status: COMPLETED
Start: 2021-07-02 | End: 2021-07-02

## 2021-07-02 RX ORDER — MORPHINE SULFATE 4 MG/ML
4 INJECTION, SOLUTION INTRAMUSCULAR; INTRAVENOUS
Status: DISCONTINUED | OUTPATIENT
Start: 2021-07-02 | End: 2021-07-03 | Stop reason: HOSPADM

## 2021-07-02 RX ORDER — SODIUM CHLORIDE 9 MG/ML
25 INJECTION, SOLUTION INTRAVENOUS PRN
Status: DISCONTINUED | OUTPATIENT
Start: 2021-07-02 | End: 2021-07-03 | Stop reason: HOSPADM

## 2021-07-02 RX ORDER — ONDANSETRON 4 MG/1
8 TABLET, ORALLY DISINTEGRATING ORAL EVERY 8 HOURS PRN
Status: DISCONTINUED | OUTPATIENT
Start: 2021-07-02 | End: 2021-07-03 | Stop reason: HOSPADM

## 2021-07-02 RX ORDER — FENTANYL CITRATE 50 UG/ML
INJECTION, SOLUTION INTRAMUSCULAR; INTRAVENOUS
Status: COMPLETED
Start: 2021-07-02 | End: 2021-07-02

## 2021-07-02 RX ORDER — MODIFIED LANOLIN
OINTMENT (GRAM) TOPICAL PRN
Status: DISCONTINUED | OUTPATIENT
Start: 2021-07-02 | End: 2021-07-03 | Stop reason: HOSPADM

## 2021-07-02 RX ORDER — SODIUM CHLORIDE 0.9 % (FLUSH) 0.9 %
10 SYRINGE (ML) INJECTION PRN
Status: DISCONTINUED | OUTPATIENT
Start: 2021-07-02 | End: 2021-07-03 | Stop reason: HOSPADM

## 2021-07-02 RX ORDER — FERROUS SULFATE 325(65) MG
325 TABLET ORAL
Status: DISCONTINUED | OUTPATIENT
Start: 2021-07-03 | End: 2021-07-03 | Stop reason: HOSPADM

## 2021-07-02 RX ORDER — SODIUM CHLORIDE 0.9 % (FLUSH) 0.9 %
10 SYRINGE (ML) INJECTION EVERY 12 HOURS SCHEDULED
Status: DISCONTINUED | OUTPATIENT
Start: 2021-07-02 | End: 2021-07-03 | Stop reason: HOSPADM

## 2021-07-02 RX ORDER — ZOLPIDEM TARTRATE 5 MG/1
5 TABLET ORAL NIGHTLY PRN
Status: DISCONTINUED | OUTPATIENT
Start: 2021-07-02 | End: 2021-07-03 | Stop reason: HOSPADM

## 2021-07-02 RX ORDER — ONDANSETRON 2 MG/ML
4 INJECTION INTRAMUSCULAR; INTRAVENOUS EVERY 6 HOURS PRN
Status: DISCONTINUED | OUTPATIENT
Start: 2021-07-02 | End: 2021-07-02 | Stop reason: HOSPADM

## 2021-07-02 RX ORDER — DIPHENHYDRAMINE HCL 25 MG
25 TABLET ORAL EVERY 6 HOURS PRN
Status: DISCONTINUED | OUTPATIENT
Start: 2021-07-02 | End: 2021-07-03 | Stop reason: HOSPADM

## 2021-07-02 RX ORDER — IBUPROFEN 800 MG/1
800 TABLET ORAL EVERY 8 HOURS
Status: DISCONTINUED | OUTPATIENT
Start: 2021-07-02 | End: 2021-07-03 | Stop reason: HOSPADM

## 2021-07-02 RX ORDER — NALBUPHINE HCL 10 MG/ML
5 AMPUL (ML) INJECTION EVERY 4 HOURS PRN
Status: DISCONTINUED | OUTPATIENT
Start: 2021-07-02 | End: 2021-07-02 | Stop reason: HOSPADM

## 2021-07-02 RX ORDER — SODIUM CHLORIDE, SODIUM LACTATE, POTASSIUM CHLORIDE, CALCIUM CHLORIDE 600; 310; 30; 20 MG/100ML; MG/100ML; MG/100ML; MG/100ML
INJECTION, SOLUTION INTRAVENOUS CONTINUOUS
Status: DISCONTINUED | OUTPATIENT
Start: 2021-07-02 | End: 2021-07-03 | Stop reason: HOSPADM

## 2021-07-02 RX ORDER — ONDANSETRON 2 MG/ML
4 INJECTION INTRAMUSCULAR; INTRAVENOUS EVERY 6 HOURS PRN
Status: DISCONTINUED | OUTPATIENT
Start: 2021-07-02 | End: 2021-07-03 | Stop reason: HOSPADM

## 2021-07-02 RX ORDER — HYDROCODONE BITARTRATE AND ACETAMINOPHEN 5; 325 MG/1; MG/1
2 TABLET ORAL EVERY 4 HOURS PRN
Status: DISCONTINUED | OUTPATIENT
Start: 2021-07-02 | End: 2021-07-03 | Stop reason: HOSPADM

## 2021-07-02 RX ORDER — NALOXONE HYDROCHLORIDE 0.4 MG/ML
0.4 INJECTION, SOLUTION INTRAMUSCULAR; INTRAVENOUS; SUBCUTANEOUS PRN
Status: DISCONTINUED | OUTPATIENT
Start: 2021-07-02 | End: 2021-07-02 | Stop reason: HOSPADM

## 2021-07-02 RX ORDER — ROPIVACAINE HYDROCHLORIDE 2 MG/ML
INJECTION, SOLUTION EPIDURAL; INFILTRATION; PERINEURAL PRN
Status: DISCONTINUED | OUTPATIENT
Start: 2021-07-02 | End: 2021-07-02 | Stop reason: SDUPTHER

## 2021-07-02 RX ORDER — HYDROCODONE BITARTRATE AND ACETAMINOPHEN 5; 325 MG/1; MG/1
1 TABLET ORAL EVERY 4 HOURS PRN
Status: DISCONTINUED | OUTPATIENT
Start: 2021-07-02 | End: 2021-07-03 | Stop reason: HOSPADM

## 2021-07-02 RX ORDER — MORPHINE SULFATE 2 MG/ML
2 INJECTION, SOLUTION INTRAMUSCULAR; INTRAVENOUS
Status: DISCONTINUED | OUTPATIENT
Start: 2021-07-02 | End: 2021-07-03 | Stop reason: HOSPADM

## 2021-07-02 RX ORDER — FENTANYL CITRATE 50 UG/ML
INJECTION, SOLUTION INTRAMUSCULAR; INTRAVENOUS PRN
Status: DISCONTINUED | OUTPATIENT
Start: 2021-07-02 | End: 2021-07-02 | Stop reason: SDUPTHER

## 2021-07-02 RX ORDER — ACETAMINOPHEN 325 MG/1
650 TABLET ORAL EVERY 4 HOURS PRN
Status: DISCONTINUED | OUTPATIENT
Start: 2021-07-02 | End: 2021-07-03 | Stop reason: HOSPADM

## 2021-07-02 RX ORDER — DOCUSATE SODIUM 100 MG/1
100 CAPSULE, LIQUID FILLED ORAL 2 TIMES DAILY
Status: DISCONTINUED | OUTPATIENT
Start: 2021-07-02 | End: 2021-07-03 | Stop reason: HOSPADM

## 2021-07-02 RX ADMIN — SODIUM CHLORIDE, POTASSIUM CHLORIDE, SODIUM LACTATE AND CALCIUM CHLORIDE: 600; 310; 30; 20 INJECTION, SOLUTION INTRAVENOUS at 06:27

## 2021-07-02 RX ADMIN — FENTANYL CITRATE 100 MCG: 50 INJECTION, SOLUTION INTRAMUSCULAR; INTRAVENOUS at 08:39

## 2021-07-02 RX ADMIN — ROPIVACAINE HYDROCHLORIDE 8 ML: 2 INJECTION, SOLUTION EPIDURAL; INFILTRATION at 08:39

## 2021-07-02 RX ADMIN — ACETAMINOPHEN 650 MG: 325 TABLET ORAL at 20:03

## 2021-07-02 RX ADMIN — SODIUM CHLORIDE, POTASSIUM CHLORIDE, SODIUM LACTATE AND CALCIUM CHLORIDE: 600; 310; 30; 20 INJECTION, SOLUTION INTRAVENOUS at 03:34

## 2021-07-02 RX ADMIN — Medication 18 ML/HR: at 03:35

## 2021-07-02 RX ADMIN — IBUPROFEN 800 MG: 800 TABLET, FILM COATED ORAL at 14:41

## 2021-07-02 RX ADMIN — SODIUM CHLORIDE, POTASSIUM CHLORIDE, SODIUM LACTATE AND CALCIUM CHLORIDE: 600; 310; 30; 20 INJECTION, SOLUTION INTRAVENOUS at 11:21

## 2021-07-02 RX ADMIN — DOCUSATE SODIUM 100 MG: 100 CAPSULE, LIQUID FILLED ORAL at 20:03

## 2021-07-02 RX ADMIN — SERTRALINE 50 MG: 50 TABLET, FILM COATED ORAL at 21:51

## 2021-07-02 RX ADMIN — Medication 166.7 ML: at 12:46

## 2021-07-02 RX ADMIN — BENZOCAINE AND LEVOMENTHOL: 200; 5 SPRAY TOPICAL at 14:36

## 2021-07-02 ASSESSMENT — PAIN SCALES - GENERAL
PAINLEVEL_OUTOF10: 4
PAINLEVEL_OUTOF10: 3
PAINLEVEL_OUTOF10: 1

## 2021-07-02 ASSESSMENT — PAIN DESCRIPTION - DESCRIPTORS
DESCRIPTORS: CRAMPING

## 2021-07-02 NOTE — FLOWSHEET NOTE
Bedside report given to Michael Canas. KERI. Transferred to 5A11 per wheelchair  with baby in arms and  at side in stable condition.

## 2021-07-02 NOTE — FLOWSHEET NOTE
Dr. Usha Smart called unit for update on patient. SVE 1/50/altagraciaotable. Pitocin was started. Currently on 2 milliunits/minute of pitocin. FHT's reactive. [facility] showing ctx every 2-5 minutes. MD states to continue with plan of care and titrating pitocin.

## 2021-07-02 NOTE — PROGRESS NOTES
Into see pt. Pt still has bloody fluid  FHTs 120's reactive  Ctx q 2-4  cvx 2-3/60-70/-2  Continue to monitor and work toward .   Мария Ahumada MD 2021 2:13 AM

## 2021-07-02 NOTE — FLOWSHEET NOTE
Pt made aware of the possibility of having a  section if FHT's go down or if pt's conditions changes. Abdomen/tess area shaved and prepped with keeley wipes.

## 2021-07-02 NOTE — LACTATION NOTE
Provided and discussed breastfeeding booklet with pt. Encouraged pt. To burp infant before feeds. Encouraged pt. To call lactation with any questions or concerns. Will continue to follow up with pt.  PRN

## 2021-07-02 NOTE — PROGRESS NOTES
Patient arrived to 5A11 from L&D via wheelchair with  present. IV of LR at 125 ml/hr infusing with 300 mls infused and 700 mls left in bag and pitocin at 87.3 ml/hr infusing with 450 ml's infused and 50 ml's wasted. Oriented patient to unit, room, and plan of care.

## 2021-07-02 NOTE — FLOWSHEET NOTE
Pt called out at 2247 stating she felt like her water broke. RN assessed patient and noticed a very small spot of blood on pt's chux pad. Pt then stated she had to go to bathroom, RN assisted patient and bloody fluid was trickling down her leg. Pt immediately got back in bed. SVE performed and moderate amount of bloody fluid was returned. SVE felt unchanged. Chux pad fluid weighed 155ml.

## 2021-07-02 NOTE — FLOWSHEET NOTE
Dr Tevin Nunez in [place] on unit. Dr Tolu Garza at nurses station and updated pt complete with lots of pressure to push. MD will remain on unit.

## 2021-07-02 NOTE — ANESTHESIA PRE PROCEDURE
Department of Anesthesiology  Preprocedure Note       Name:  Mahesh Garcia   Age:  32 y.o.  :  1990                                          MRN:  [id number]         Date:  2021      Surgeon: * No surgeons listed *    Procedure: * No procedures listed *    Medications prior to admission:   Prior to Admission medications    Medication Sig Start Date End Date Taking?  Authorizing Provider   Prenatal MV-Min-Fe Fum-FA-DHA (PRENATAL 1 PO) Take by mouth   Yes Historical Provider, MD   metoprolol succinate (TOPROL XL) 25 MG extended release tablet Take 25 mg by mouth daily   Yes Historical Provider, MD   acetaminophen (TYLENOL) 500 MG tablet Take 1,000 mg by mouth every 6 hours as needed for Pain   Yes Historical Provider, MD   sertraline (ZOLOFT) 50 MG tablet Take 50 mg by mouth daily   Yes Historical Provider, MD   Loratadine (CLARITIN PO) Take by mouth   Yes Historical Provider, MD       Current medications:    Current Facility-Administered Medications   Medication Dose Route Frequency Provider Last Rate Last Admin    naloxone (NARCAN) injection 0.4 mg  0.4 mg Intravenous PRN Juan Carlos Diaz DO        nalbuphine (NUBAIN) injection 5 mg  5 mg Intravenous Q4H PRN Juan Carlos Began DO Emily        ondansetron (ZOFRAN) injection 4 mg  4 mg Intravenous Q6H PRN Juan Carlos Began DO Emily        fentaNYL 750 mcg, ropivacaine 0.1% in sodium chloride 0.9% 265mL (OB) epidural  18 mL/hr Epidural Continuous David Diaz DO        oxytocin (PITOCIN) 30 units in 500 mL infusion  1 yonatan-units/min Intravenous Continuous Giuliana Pascal MD 6 mL/hr at 21 0215 6 yonatan-units/min at 21 0215    lactated ringers infusion   Intravenous Continuous Giuliana Pascal  mL/hr at 21 2317 New Bag at 21 2317    lactated ringers bolus  500 mL Intravenous PRN Giuliana Pascal MD        Or    lactated ringers bolus  1,000 mL Intravenous PRN Giuliana Pascal MD        sodium chloride flush 0.9 % injection 5-40 mL  5-40 mL Intravenous 2 times per day Julio Cesar Mathias MD        sodium chloride flush 0.9 % injection 5-40 mL  5-40 mL Intravenous MAI Mathias MD        0.9 % sodium chloride infusion  25 mL Intravenous MAI Mathias MD        lidocaine PF 1 % injection 30 mL  30 mL Other ARTURON Julio Cesar Mathias MD        butorphanol (STADOL) injection 1 mg  1 mg Intravenous Q1H MAI Mathias MD        nitrous oxide 50% inhalation 1 each  1 each Inhalation Continuous PRCHRIS Mathias MD        ondansetron (ZOFRAN) injection 8 mg  8 mg Intravenous Q6H MAI Mathias MD        diphenhydrAMINE (BENADRYL) injection 25 mg  25 mg Intravenous Q4H PRCHRIS Mathias MD        oxytocin (PITOCIN) 10 unit bolus from the bag  10 Units Intravenous MAI Mathias MD        And    oxytocin (PITOCIN) 30 units in 500 mL infusion  87.3 yonatan-units/min Intravenous MAI Mathias MD        methylergonovine (METHERGINE) injection 200 mcg  200 mcg Intramuscular MAI Mathias MD        carboprost (HEMABATE) injection 250 mcg  250 mcg Intramuscular MAI Mathias MD        miSOPROStol (CYTOTEC) tablet 1,000 mcg  1,000 mcg Rectal PRCHRIS Mathias MD        acetaminophen (TYLENOL) tablet 650 mg  650 mg Oral Q4H MAI Mathias MD        ibuprofen (ADVIL;MOTRIN) tablet 800 mg  800 mg Oral Q8H PRCHRIS Mathias MD        morphine (PF) injection 2 mg  2 mg Intravenous Q2H MAI Mathias MD        Or    morphine injection 4 mg  4 mg Intravenous Q2H PRN Julio Cesar Oiler, MD        witch hazel-glycerin (TUCKS) pad   Topical PRCHRIS Mathias MD        benzocaine-menthol (DERMOPLAST) 20-0.5 % spray   Topical MAI Mathias MD        metoprolol succinate (TOPROL XL) extended release tablet 25 mg  25 mg Oral Nightly Julio Cesar Mathias MD           Allergies: Allergies   Allergen Reactions    Azithromycin Hives     z pack-rash    Flagyl [Metronidazole] Hives    Sulfa Antibiotics Hives     Reaction as a kid         Problem List:    Patient Active Problem List   Diagnosis Code    Biliary colic P46.06       Past Medical History:        Diagnosis Date    Heart abnormality     tachy during pregnancy    Infertility, female     took 1 round of clomid first preg    PONV (postoperative nausea and vomiting)     Postpartum depression     on meds post partum with 1st baby    Prolonged emergence from general anesthesia        Past Surgical History:        Procedure Laterality Date    CHOLECYSTECTOMY, LAPAROSCOPIC N/A 4/27/2020    CHOLECYSTECTOMY LAPAROSCOPIC ROBOTIC, performed by David Jason MD at [facility]  12/2018    WISDOM TOOTH EXTRACTION      [place] in [address] History:    Social History     Tobacco Use    Smoking status: Never Smoker    Smokeless tobacco: Never Used   Substance Use Topics    Alcohol use: No                                Counseling given: Not Answered      Vital Signs (Current):   Vitals:    07/02/21 0215 07/02/21 0220 07/02/21 0225 07/02/21 0230   BP: (!) 92/53      Pulse: 93      Resp: 16      Temp:       SpO2: 97% 98% 96% 97%   Weight:       Height:                                                  BP Readings from Last 3 Encounters:   07/02/21 (!) 92/53   05/11/20 114/70   04/27/20 (!) 102/57       NPO Status: Time of last liquid consumption: 0630                        Time of last solid consumption: 0630                        Date of last liquid consumption: 07/01/21                        Date of last solid food consumption: 07/01/21    BMI:   Wt Readings from Last 3 Encounters:   07/01/21 199 lb (90.3 kg)   05/11/20 161 lb (73 kg)   04/27/20 165 lb 3.2 oz (74.9 kg)     Body mass index is 32.12 kg/m².     CBC:   Lab Results   Component Value Date    WBC 8.5 07/01/2021    RBC 3.52 07/01/2021    RBC 4.68 01/04/2021    HGB 8.8 07/01/2021    HCT 28.5 07/01/2021    MCV 81.0 07/01/2021    RDW 13.1 01/04/2021     07/01/2021       CMP:   Lab Results   Component Value Date     09/17/2019    K 3.7 09/17/2019    CL 97 09/17/2019    CO2 27 09/17/2019    BUN 10 09/17/2019    CREATININE 0.9 09/17/2019    LABGLOM 74 09/17/2019    GLUCOSE 116 09/17/2019    GLUCOSE 80 11/05/2011    PROT 6.9 04/23/2020    CALCIUM 9.1 09/17/2019    BILITOT 0.6 04/23/2020    ALKPHOS 77 04/23/2020    AST 17 04/23/2020    ALT 13 04/23/2020       POC Tests: No results for input(s): POCGLU, POCNA, POCK, POCCL, POCBUN, POCHEMO, POCHCT in the last 72 hours. Coags: No results found for: PROTIME, INR, APTT    HCG (If Applicable):   Lab Results   Component Value Date    PREGTESTUR NEGATIVE 04/27/2020        ABGs: No results found for: PHART, PO2ART, ASG7KAW, NFT0PLR, BEART, U5OGZQUA     Type & Screen (If Applicable):  Lab Results   Component Value Date    LABABO B 01/04/2021    [address] POS 07/01/2021       Drug/Infectious Status (If Applicable):  No results found for: HIV, HEPCAB    COVID-19 Screening (If Applicable): No results found for: COVID19        Anesthesia Evaluation  Patient summary reviewed and Nursing notes reviewed no history of anesthetic complications:   Airway: Mallampati: II  TM distance: >3 FB   Neck ROM: full  Mouth opening: > = 3 FB Dental:          Pulmonary:Negative Pulmonary ROS and normal exam                               Cardiovascular:Negative CV ROS              Rate: normal                    Neuro/Psych:   (+) psychiatric history:depression/anxiety             GI/Hepatic/Renal: Neg GI/Hepatic/Renal ROS            Endo/Other: Negative Endo/Other ROS                    Abdominal:             Vascular: Other Findings:             Anesthesia Plan      epidural     ASA 2             Anesthetic plan and risks discussed with patient. Plan discussed with CRNA.     Attending anesthesiologist reviewed and agrees with Preprocedure content              KITA Flor CRNA   7/2/2021

## 2021-07-02 NOTE — FLOWSHEET NOTE
Dr Nikos Parkinson in room. Update given. Repositioned sitting at edge of bed for epidural placement. Time out completed. Epidural removed per MD. Tip intact.

## 2021-07-02 NOTE — PLAN OF CARE
Problem: Anxiety:  Goal: Level of anxiety will decrease  Description: Level of anxiety will decrease  Outcome: Ongoing  Note: Pt remains calm about the birthing experience,  at bedside, supportive. All questions/concerns addressed by RN. Problem: Breathing Pattern - Ineffective:  Goal: Able to breathe comfortably  Description: Able to breathe comfortably  Outcome: Ongoing  Note: Pt's respirations unlabored, no signs of respiratory distress     Problem: Fluid Volume - Imbalance:  Goal: Absence of intrapartum hemorrhage signs and symptoms  Description: Absence of intrapartum hemorrhage signs and symptoms  Outcome: Ongoing  Note: No vaginal bleeding noted, will continue to monitor. Problem: Infection - Intrapartum Infection:  Goal: Will show no infection signs and symptoms  Description: Will show no infection signs and symptoms  Outcome: Ongoing  Note: Vitals stable, pt remains afebrile. GBS neg. FHT's remain reassuring, will continue to monitor. Problem: Labor Process - Prolonged:  Goal: Uterine contractions within specified parameters  Description: Uterine contractions within specified parameters  Outcome: Ongoing  Note: Pt zaki every 2-4 minutes. Titrating pitocin per orders     Problem:  Screening:  Goal: Ability to make informed decisions regarding treatment has improved  Description: Ability to make informed decisions regarding treatment has improved  Outcome: Ongoing  Note: Pt able to make own decisions     Problem: Pain - Acute:  Goal: Able to cope with pain  Description: Able to cope with pain  Outcome: Ongoing  Note: Pt states she has occasional pain with contractions. Planning for epidural.     Problem: Tissue Perfusion - Uteroplacental, Altered:  Goal: Absence of abnormal fetal heart rate pattern  Description: Absence of abnormal fetal heart rate pattern  Outcome: Ongoing  Note: Fetal Heart Tones remain reassuring. Continuous EFM in place.         Problem: Urinary Retention:  Goal: Urinary elimination within specified parameters  Description: Urinary elimination within specified parameters  Outcome: Ongoing  Note: Voiding without complications. Problem: Falls - Risk of:  Goal: Will remain free from falls  Description: Will remain free from falls  Outcome: Ongoing  Note: Pt. Remains free from falls at this time. IV infusing per order. RN encouraged pt. To call for assistance to BR. Side rails up X2. Call light within reach. S.O. At bedside. RN will continue to provide for a safe environment. Problem: Discharge Planning:  Goal: Discharged to appropriate level of care  Description: Discharged to appropriate level of care  Outcome: Ongoing  Note: Pt aware of 2hr recovery period in L&D and then transfer to mom/baby for the remainder of her stay. Care plan reviewed with patient and . Patient and  verbalize understanding of the plan of care and contribute to goal setting.

## 2021-07-02 NOTE — FLOWSHEET NOTE
At bedside reassessing patient. Pt states she has had a few more gushes of fluid. Abdomen soft at rest.  States she feels lower abdominal pain that is correlating during contractions. FHT's reactive.

## 2021-07-02 NOTE — ANESTHESIA PROCEDURE NOTES
Epidural Block    Patient location during procedure: OB  Start time: 7/2/2021 3:15 AM  End time: 7/2/2021 3:24 AM  Reason for block: labor epidural  Staffing  Performed: resident/CRNA   Anesthesiologist: Chio Eugene DO  Resident/CRNA: KITA Joyner - CRNA  Preanesthetic Checklist  Completed: patient identified, IV checked, site marked, risks and benefits discussed, surgical consent, monitors and equipment checked, pre-op evaluation, timeout performed, anesthesia consent given, oxygen available and patient being monitored  Epidural  Patient position: sitting  Prep: ChloraPrep  Patient monitoring: frequent blood pressure checks and continuous pulse ox  Approach: midline  Location: lumbar (1-5)  Injection technique: CEFERINO air  Provider prep: sterile gloves and mask  Needle  Needle type: Tuohy   Needle gauge: 18 G  Needle length: 3.5 in  Needle insertion depth: 7 cm  Catheter type: stylet  Catheter size: 20.  Catheter at skin depth: 13 cm  Test dose: negative  Kit: morillo  Lot number: [id number]  Expiration date: 1/3/2022  Assessment  Sensory level: T6  Hemodynamics: stable  Block attempts: 3.   Additional Notes  Test dose- Lido 1.5%-3ml at 0324  Loading=rop 0.1%-6ml

## 2021-07-02 NOTE — L&D DELIVERY NOTE
Department of Obstetrics and Gynecology   Vaginal Delivery Note at [facility]  Date of Delivery:  2021    Procedure:  Spontaneous vaginal delivery    Surgeon:   LANE Davalos    Past Medical History:   Past Medical History:   Diagnosis Date    Heart abnormality     tachy during pregnancy    Infertility, female     took 1 round of clomid first preg    PONV (postoperative nausea and vomiting)     Postpartum depression     on meds post partum with 1st baby    Prolonged emergence from general anesthesia        Anesthesia:  epidural anesthesia    Estimated blood loss:  500ml    Specimen:  Placenta sent to pathology     Cord blood sent Yes    Complications:  Partial abruption    Condition:  infant stable to general nursery and mother stable    Details of Procedure: The patient is a 32 y.o. female at 37w6d   OB History        2    Para   1    Term   1       0    AB   0    Living   1       SAB   0    TAB   0    Ectopic   0    Molar        Multiple   0    Live Births   1             who was admitted for induction. She received the following interventions: IV Pitocin augmentation. The patient progressed well,did receive an epidural, became complete and started to push. Patient progressed well and the fetal head was delivered over an intact perineum, nose and mouth suctioned with bulb suction and the rest of the infant delivered atraumatically, placed on mother abdomen. Cord was clamped and cut and infant handed off to the waiting nurse for evaluation. The delivery of the placenta was spontaneous. The perineum and vagina were explored and a right labial laceration was repaired in standard fashion. Vaginal sweep was performed and there were no sponges retained in the vagina. All sharps were discarded.     Infant Wt:   Information for the patient's :  Phyllis Signs [[id number]]             APGARS:     Information for the patient's :  Phyllis Signs [[id number]] Greta Libman, MD

## 2021-07-02 NOTE — FLOWSHEET NOTE
Dr Sherlie Lombard finished in [address] and in room. Pt pushing. Dr Sherlie Lombard changing into gown to do delivery. Dr Crum Just remains at bedside.

## 2021-07-02 NOTE — FLOWSHEET NOTE
Dr. Gerber Champion called unit to get update on patient. FHT's remain reactive. Contractions every 2-4 and titrating pitocin per orders. Pt wants to change positions, to left side. Small amount of bloody fluid on chux noted prior to changing positions.

## 2021-07-02 NOTE — FLOWSHEET NOTE
Dr. Jaylen Sheth returned page. Updated that at [phone] patient called out stating \"she felt fluid come out and unsure if her water broke. \" Upon assessment there was a small spot on chux pad, small than the palm of a hand. Pt had to go bathroom and RN stood her up and bloody fluid was trickling out. Put patient back to bed. Attempted to do SVE and there was a moderate amount of bloody fluid that returned on to pad. Chux pad had 155ml of bloody fluid. Attempted to recheck SVE and unchanged from first exam, very posterior and hard to reach. FHT's remain reassuring. Baseline was 130-135 and now is 150-155 with moderate variability. Pt zaki every 2-3. Abdomen palpates soft. Pt states she only feels abdominal pain during contractions in the lower abdomen. Will continue to assess patient and notify MD with any changes in FHT's or mothers condition.

## 2021-07-02 NOTE — FLOWSHEET NOTE
Dr. Gena Nelson in room when doing SVE on pt. SVE unchanged 2-3/70/-2. No blood returned on vag exam.  Pt now comfortable with epidural. Continue with plan of care and working towards [facility].

## 2021-07-02 NOTE — H&P
[address] [id number]  History and Physical Update    Pt Name: Lucretia Robb  MRN: [id number]  YOB: 1990  Date of evaluation: 2021    [] I have examined the patient and reviewed the H&P/Consult and there are no changes to the patient or plans. [] I have examined the patient and reviewed the H&P/Consult and have noted the following changes:     33 yo  at 37 weeks presents for IOL/augmentation secondary to decreased FM x 2 days    NST reactive in the office with ctx q 5  Zaki initially here and cvx 50/ballotable. Called at ~11pm that pt had SROM for bloody amniotic fluid    On exam cvx 2-3/60/-2-3 with copious bloody fluid  FHTs reactive with multiple accels and moderate variability no decels    Disc with pt and  that we are unsure of source of bleeding. No evidence of fetal compromise. Will continue to work toward  and continue to monitor    Discussion with the patient and/ or family for proposed care, treatment, services; benefits, risks, side effects; likelihood of achieving goals and potential problems that may occur during recuperation was had and all questions were answered. Discussion with the patient and/ or family of reasonable alternatives to the proposed care, treatment, services and the discussion of the risks, benefits, side effects related to the alternatives and the risk related to not receiving the proposed care treatment services was also had and all questions were answered. If this is for an elective surgical procedure then The patient was counseled at length about the risks of zaki Covid-19 during their perioperative period and any recovery window from their procedure. The patient was made aware that zaki Covid-19  may worsen their prognosis for recovering from their procedure  and lend to a higher morbidity and/or mortality risk.   All material risks, benefits, and reasonable alternatives including postponing the procedure were discussed. The patient  does wish to proceed with the procedure at this time.              Ludivina Camp MD,MD  Electronically signed 7/2/2021 at 2:03 AM

## 2021-07-02 NOTE — FLOWSHEET NOTE
Haleigh Guillory CRNA. Pt requesting epidural. Rating contractions 8/10. Sehila en route to department.

## 2021-07-02 NOTE — PROGRESS NOTES
Pt has epidural which is pretty one sided right now. FHT remain category 1. 130/ mod lena/ + accels no decells. CVX examined and 3-4/80/-2. Head not well applied yet. Bloody fluid expressed with check dark red. Discussed situation. ? Partial abruption but with baby tolerating labor would be ok with continuing with IOL at this point. She is agreeable. Will continue to monitor FHT closely and discussed low threshold for C/S if needed due to bloody fluid. Also discussed possible need for blood transfusion with her anemia. All questions answered. Will continue to monitor closely.

## 2021-07-02 NOTE — ANESTHESIA PROCEDURE NOTES
Epidural Block    Patient location during procedure: OB  Start time: 7/2/2021 9:10 AM  Reason for block: labor epidural  Staffing  Performed: anesthesiologist   Anesthesiologist: Adela Summers DO  Preanesthetic Checklist  Completed: patient identified, IV checked, site marked, risks and benefits discussed, surgical consent, monitors and equipment checked, pre-op evaluation, timeout performed, anesthesia consent given, oxygen available and patient being monitored  Epidural  Patient position: sitting  Prep: Betadine  Patient monitoring: continuous pulse ox and frequent blood pressure checks  Approach: midline  Location: lumbar (1-5)  Injection technique: CEFERINO saline  Provider prep: mask and sterile gloves  Needle  Needle type: Tuohy   Needle gauge: 18 G  Needle length: 3.5 in  Needle insertion depth: 5 cm  Catheter type: side hole  Catheter size: 19 G  Catheter at skin depth: 9 cm  Test dose: negative  Assessment  Hemodynamics: stable  Attempts: 1

## 2021-07-02 NOTE — FLOWSHEET NOTE
Dr. Carlos Goodman in room assessing patient. SVE performed and amnihook utilized with return of bloody amniotic fluid.

## 2021-07-02 NOTE — FLOWSHEET NOTE
Dr Dolly Kong in room. Update given. Plan of care discussed. Dr Ashley Elder will be covering this morning.

## 2021-07-02 NOTE — PLAN OF CARE
Problem: Anxiety:  Goal: Level of anxiety will decrease  Description: Level of anxiety will decrease  Outcome: Ongoing  Note: Calm and co operative. Plan of care and procedures explained. Problem: Breathing Pattern - Ineffective:  Goal: Able to breathe comfortably  Description: Able to breathe comfortably  Outcome: Ongoing  Note: Respirations easy and unlabored. Vital signs stable. Problem: Fluid Volume - Imbalance:  Goal: Absence of intrapartum hemorrhage signs and symptoms  Description: Absence of intrapartum hemorrhage signs and symptoms  Outcome: Ongoing  Note: Small/mod blood tinged amniotic fluid noted. IV fluids infusing. Vital signs stable. Problem: Infection - Intrapartum Infection:  Goal: Will show no infection signs and symptoms  Description: Will show no infection signs and symptoms  Outcome: Ongoing  Note: Afebrile -GBS     Problem: Labor Process - Prolonged:  Goal: Uterine contractions within specified parameters  Description: Uterine contractions within specified parameters  Outcome: Ongoing  Note: Continuous toco monitoring. Ctxs 2-4 minutes apart. Problem: Pain - Acute:  Goal: Able to cope with pain  Description: Able to cope with pain  Outcome: Ongoing  Note: Comfortable with epidural. Denies feeling ctxs. Problem: Tissue Perfusion - Uteroplacental, Altered:  Goal: Absence of abnormal fetal heart rate pattern  Description: Absence of abnormal fetal heart rate pattern  Outcome: Ongoing  Note: Continuous fetal monitoring. FHTs reactive. Problem: Urinary Retention:  Goal: Urinary elimination within specified parameters  Description: Urinary elimination within specified parameters  Outcome: Ongoing  Note: Gonzalez draining QS/blood tinged urine. Problem: Falls - Risk of:  Goal: Will remain free from falls  Description: Will remain free from falls  Outcome: Ongoing  Note: Bedrest. Side rails up x2. Call light within reach.  in room.       Problem: Discharge Planning:  Goal: Discharged to appropriate level of care  Description: Discharged to appropriate level of care  Outcome: Ongoing  Note: Elsa is aware she will remain on labor/delivery unit for 2 hrs post delivery bonding with baby then transfer to mom/baby unit for continued care til discharge. Care plan reviewed with patient and . Patient and  verbalize understanding of the plan of care and contribute to goal setting.

## 2021-07-02 NOTE — FLOWSHEET NOTE
MD on unit. Updated that patient has not taken evening dose of metoprolol. BP 98/55 and HR 91. MD states to hold dose.

## 2021-07-03 VITALS
TEMPERATURE: 98.2 F | OXYGEN SATURATION: 99 % | DIASTOLIC BLOOD PRESSURE: 63 MMHG | BODY MASS INDEX: 31.98 KG/M2 | HEIGHT: 66 IN | RESPIRATION RATE: 16 BRPM | WEIGHT: 199 LBS | SYSTOLIC BLOOD PRESSURE: 105 MMHG | HEART RATE: 106 BPM

## 2021-07-03 PROCEDURE — 6370000000 HC RX 637 (ALT 250 FOR IP): Performed by: OBSTETRICS & GYNECOLOGY

## 2021-07-03 RX ADMIN — IBUPROFEN 800 MG: 800 TABLET, FILM COATED ORAL at 00:21

## 2021-07-03 RX ADMIN — IBUPROFEN 800 MG: 800 TABLET, FILM COATED ORAL at 08:25

## 2021-07-03 RX ADMIN — FERROUS SULFATE TAB 325 MG (65 MG ELEMENTAL FE) 325 MG: 325 (65 FE) TAB at 08:24

## 2021-07-03 RX ADMIN — ACETAMINOPHEN 650 MG: 325 TABLET ORAL at 08:25

## 2021-07-03 RX ADMIN — DOCUSATE SODIUM 100 MG: 100 CAPSULE, LIQUID FILLED ORAL at 08:25

## 2021-07-03 ASSESSMENT — PAIN SCALES - GENERAL
PAINLEVEL_OUTOF10: 3
PAINLEVEL_OUTOF10: 5
PAINLEVEL_OUTOF10: 5
PAINLEVEL_OUTOF10: 1

## 2021-07-03 NOTE — ANESTHESIA POSTPROCEDURE EVALUATION
Department of Anesthesiology  Postprocedure Note    Patient: Valeriy Bird  MRN: [id number]  YOB: 1990  Date of evaluation: 7/3/2021  Time:  8:39 AM     Procedure Summary     Date: 07/02/21 Room / Location:     Anesthesia Start: 0256 Anesthesia Stop: 1243    Procedure: Labor Analgesia Diagnosis:     Scheduled Providers:  Responsible Provider: Joey Orellana DO    Anesthesia Type: epidural ASA Status: 2          Anesthesia Type: epidural    Avtar Phase I: Avtar Score: 10    Avtar Phase II: Avtar Score: 10    Last vitals: Reviewed and per EMR flowsheets.        Anesthesia Post Evaluation    Patient location during evaluation: floor  Patient participation: complete - patient participated  Level of consciousness: awake  Airway patency: patent  Nausea & Vomiting: no vomiting and no nausea  Complications: no  Cardiovascular status: hemodynamically stable  Respiratory status: acceptable  Hydration status: stable

## 2021-07-03 NOTE — PLAN OF CARE
Problem: Discharge Planning:  Goal: Discharged to appropriate level of care  Description: Discharged to appropriate level of care  Outcome: Ongoing  Note: On the Maternity Unit for care and medication     Problem: Constipation:  Goal: Bowel elimination is within specified parameters  Description: Bowel elimination is within specified parameters  Outcome: Ongoing  Note: Passing gas, Colace given po     Problem: Fluid Volume - Imbalance:  Goal: Absence of postpartum hemorrhage signs and symptoms  Description: Absence of postpartum hemorrhage signs and symptoms  Outcome: Ongoing  Note: Moderate amount of red lochia fundus is firm and centered     Problem: Infection - Risk of, Puerperal Infection:  Goal: Will show no infection signs and symptoms  Description: Will show no infection signs and symptoms  Outcome: Ongoing  Note: V/S WNL, no untoward s/s reported     Problem: Mood - Altered:  Goal: Mood stable  Description: Mood stable  Outcome: Ongoing  Note: Pleasant     Problem: Pain - Acute:  Goal: Pain level will decrease  Description: Pain level will decrease  Outcome: Ongoing  Note: Pain level was ratd at \"1\", pain goal is \"2\". Patient takes Tylenol and MOtrin for pain, voiced with relief   Care plan reviewed with patient and verbalized understanding. Patient contributed to goal setting.

## 2021-07-03 NOTE — DISCHARGE SUMMARY
Obstetric Discharge Summary      Pt Name: Sraa Man  MRN: [id number] [place] #: [de-identified]  YOB: 1990        Admitting Diagnosis  IUP  OB History        2    Para   2    Term   2       0    AB   0    Living   2       SAB   0    TAB   0    Ectopic   0    Molar        Multiple   0    Live Births   2                Reasons for Admission on 2021  6:38 PM  Postpartum care following vaginal delivery [Z39.2]  No comment available  Vaginal Delivery      Intrapartum Procedures                          Postpartum/Operative Complications       [place] Data  Information for the patient's :  Gladis Drake [[id number]]   male   Birth Weight: 7 lb 12.2 oz (3.52 kg)       Discharge Diagnosis   , partial abruption    Discharge Information  Current Discharge Medication List      CONTINUE these medications which have NOT CHANGED    Details   Prenatal MV-Min-Fe Fum-FA-DHA (PRENATAL 1 PO) Take by mouth      metoprolol succinate (TOPROL XL) 25 MG extended release tablet Take 25 mg by mouth daily      acetaminophen (TYLENOL) 500 MG tablet Take 1,000 mg by mouth every 6 hours as needed for Pain      sertraline (ZOLOFT) 50 MG tablet Take 50 mg by mouth daily      Loratadine (CLARITIN PO) Take by mouth         STOP taking these medications       ondansetron (ZOFRAN ODT) 4 MG disintegrating tablet Comments:   Reason for Stopping:         norgestimate-ethinyl estradiol ([address] [address]) 0.25-35 MG-MCG per tablet Comments:   Reason for Stopping:         ibuprofen (ADVIL;MOTRIN) 200 MG tablet Comments:   Reason for Stopping:               No discharge procedures on file. Vaginal Delivery  Diet regular  Condition Good    Discharge to:  home  Follow up in 5-6 wks.   Diana Burns MD 7/3/2021 8:32 AM

## 2021-07-03 NOTE — PLAN OF CARE
Problem: Discharge Planning:  Goal: Discharged to appropriate level of care  Description: Discharged to appropriate level of care  7/3/2021 1136 by Suman Baker RN  Outcome: Completed  7/2/2021 2204 by Freddy Galeana RN  Outcome: Ongoing  Note: On the Maternity Unit for care and medication     Problem: Constipation:  Goal: Bowel elimination is within specified parameters  Description: Bowel elimination is within specified parameters  7/3/2021 1136 by Suman Baker RN  Outcome: Completed  7/2/2021 2204 by Freddy Galeana RN  Outcome: Ongoing  Note: Passing gas, Colace given po     Problem: Fluid Volume - Imbalance:  Goal: Absence of postpartum hemorrhage signs and symptoms  Description: Absence of postpartum hemorrhage signs and symptoms  7/3/2021 1136 by Suman Baker RN  Outcome: Completed  7/2/2021 2204 by Freddy Galeana RN  Outcome: Ongoing  Note: Moderate amount of red lochia fundus is firm and centered     Problem: Infection - Risk of, Puerperal Infection:  Goal: Will show no infection signs and symptoms  Description: Will show no infection signs and symptoms  7/3/2021 1136 by Suman Baker RN  Outcome: Completed  7/2/2021 2204 by Freddy Galeana RN  Outcome: Ongoing  Note: V/S WNL, no untoward s/s reported     Problem: Mood - Altered:  Goal: Mood stable  Description: Mood stable  7/3/2021 1136 by Suman Baker RN  Outcome: Completed  7/2/2021 2204 by Freddy Galeana RN  Outcome: Ongoing  Note: Pleasant     Problem: Pain - Acute:  Goal: Pain level will decrease  Description: Pain level will decrease  7/3/2021 1136 by Suman Baker RN  Outcome: Completed  7/2/2021 2204 by Freddy Galeana RN  Outcome: Ongoing  Note: Pain level was ratd at \"1\", pain goal is \"2\". Patient takes Tylenol and MOtrin for pain, voiced with relief     Care plan reviewed with patient and she contributes to goal setting and voices understanding of plan of care.

## 2021-07-03 NOTE — PROGRESS NOTES
Department of Obstetrics and Gynecology  Labor and Delivery  Attending Post Partum Progress Note    PPD #1    SUBJECTIVE: Feeling well    OBJECTIVE:     Vitals:  /63   Pulse 106   Temp 98.2 °F (36.8 °C) (Oral)   Resp 16   Ht 5' 6\" (1.676 m)   Wt 199 lb (90.3 kg)   SpO2 99%   Breastfeeding Unknown   BMI 32.12 kg/m²     Uterus:  normal size, well involuted, firm, non-tender    DATA:     No results found for this or any previous visit (from the past 24 hour(s)). ASSESSMENT & PLAN:  Doing well.  Plan discharge on day 1,    Electronically signed by Jad Hinojosa MD on 7/3/2021 at 8:32 AM

## 2021-07-06 NOTE — DISCHARGE SUMMARY
[facility]  PHYSICAL THERAPY QUICK DISCHARGE NOTE  OUTPATIENT  [facility]. Patient Name: Anna Duff        CSN: [id number]   YOB: 1990  Gender: female  Kuldeep Kumar MD,    Pelvic and perineal pain [R10.2]  Supervision of other high risk pregnancies, third trimester [O09.893] ,      Patient is discharged from Physical Therapy services at this time. See last note for details related to results of therapy and goal achievement. Reason for discharge:  Pt was on hold up to delivery pending need for additional treatments. She did deliver before needing further treatment. She is discharged from therapy as of today. Pending need for additional services post-partum we would need to do a new evaluation regardless.       Bell Hartman, MPT

## 2021-09-14 ENCOUNTER — HOSPITAL ENCOUNTER (OUTPATIENT)
Dept: WOMENS IMAGING | Age: 31
Discharge: HOME | End: 2021-09-14
Payer: COMMERCIAL

## 2021-09-14 DIAGNOSIS — N61.0 MASTITIS: ICD-10-CM

## 2021-09-14 PROCEDURE — 76642 ULTRASOUND BREAST LIMITED: CPT

## 2022-07-20 ENCOUNTER — NURSE ONLY (OUTPATIENT)
Dept: LAB | Age: 32
End: 2022-07-20

## 2022-07-24 LAB
APTIMA MEDIA TYPE: NORMAL
CHLAMYDIA TRACHOMATIS AMPLIFIED DET: NEGATIVE
NEISSERIA GONORRHOEAE BY AMP: NEGATIVE
SPECIMEN SOURCE: NORMAL
T. VAGINALIS SPECIMEN SOURCE: NORMAL
TRICHOMONAS VAGINALIS BY NAA: NEGATIVE

## 2022-11-21 ENCOUNTER — HOSPITAL ENCOUNTER (EMERGENCY)
Age: 32
Discharge: HOME | End: 2022-11-21
Payer: COMMERCIAL

## 2022-11-21 ENCOUNTER — APPOINTMENT (OUTPATIENT)
Dept: GENERAL RADIOLOGY | Age: 32
End: 2022-11-21
Payer: COMMERCIAL

## 2022-11-21 ENCOUNTER — APPOINTMENT (OUTPATIENT)
Dept: MRI IMAGING | Age: 32
End: 2022-11-21
Payer: COMMERCIAL

## 2022-11-21 ENCOUNTER — APPOINTMENT (OUTPATIENT)
Dept: CT IMAGING | Age: 32
End: 2022-11-21
Payer: COMMERCIAL

## 2022-11-21 VITALS
HEIGHT: 66 IN | HEART RATE: 85 BPM | TEMPERATURE: 98.3 F | DIASTOLIC BLOOD PRESSURE: 72 MMHG | BODY MASS INDEX: 27.32 KG/M2 | RESPIRATION RATE: 16 BRPM | WEIGHT: 170 LBS | SYSTOLIC BLOOD PRESSURE: 116 MMHG | OXYGEN SATURATION: 100 %

## 2022-11-21 DIAGNOSIS — R20.2 PARESTHESIAS: Primary | ICD-10-CM

## 2022-11-21 DIAGNOSIS — R07.89 ATYPICAL CHEST PAIN: ICD-10-CM

## 2022-11-21 LAB
ALBUMIN SERPL-MCNC: 4.3 G/DL (ref 3.5–5.1)
ALP BLD-CCNC: 74 U/L (ref 38–126)
ALT SERPL-CCNC: < 5 U/L (ref 11–66)
ANION GAP SERPL CALCULATED.3IONS-SCNC: 9 MEQ/L (ref 8–16)
AST SERPL-CCNC: 14 U/L (ref 5–40)
BASOPHILS # BLD: 0.6 %
BASOPHILS ABSOLUTE: 0.1 THOU/MM3 (ref 0–0.1)
BILIRUB SERPL-MCNC: 0.8 MG/DL (ref 0.3–1.2)
BUN BLDV-MCNC: 9 MG/DL (ref 7–22)
C-REACTIVE PROTEIN: < 0.3 MG/DL (ref 0–1)
CALCIUM SERPL-MCNC: 9.2 MG/DL (ref 8.5–10.5)
CHLORIDE BLD-SCNC: 101 MEQ/L (ref 98–111)
CO2: 30 MEQ/L (ref 23–33)
CREAT SERPL-MCNC: 0.7 MG/DL (ref 0.4–1.2)
D-DIMER QUANTITATIVE: < 215 NG/ML FEU (ref 0–500)
EOSINOPHIL # BLD: 2.7 %
EOSINOPHILS ABSOLUTE: 0.3 THOU/MM3 (ref 0–0.4)
ERYTHROCYTE [DISTWIDTH] IN BLOOD BY AUTOMATED COUNT: 13.1 % (ref 11.5–14.5)
ERYTHROCYTE [DISTWIDTH] IN BLOOD BY AUTOMATED COUNT: 43.1 FL (ref 35–45)
FOLATE: 11.6 NG/ML (ref 4.8–24.2)
GFR SERPL CREATININE-BSD FRML MDRD: > 60 ML/MIN/1.73M2
GLUCOSE BLD-MCNC: 95 MG/DL (ref 70–108)
HCT VFR BLD CALC: 43.6 % (ref 37–47)
HEMOGLOBIN: 14.2 GM/DL (ref 12–16)
IMMATURE GRANS (ABS): 0.05 THOU/MM3 (ref 0–0.07)
IMMATURE GRANULOCYTES: 0.5 %
LIPASE: 35.2 U/L (ref 5.6–51.3)
LYMPHOCYTES # BLD: 21.6 %
LYMPHOCYTES ABSOLUTE: 2.2 THOU/MM3 (ref 1–4.8)
MCH RBC QN AUTO: 29.5 PG (ref 26–33)
MCHC RBC AUTO-ENTMCNC: 32.6 GM/DL (ref 32.2–35.5)
MCV RBC AUTO: 90.5 FL (ref 81–99)
MONOCYTES # BLD: 5.7 %
MONOCYTES ABSOLUTE: 0.6 THOU/MM3 (ref 0.4–1.3)
NUCLEATED RED BLOOD CELLS: 0 /100 WBC
OSMOLALITY CALCULATION: 277.9 MOSMOL/KG (ref 275–300)
PLATELET # BLD: 277 THOU/MM3 (ref 130–400)
PMV BLD AUTO: 9.8 FL (ref 9.4–12.4)
POTASSIUM SERPL-SCNC: 3.5 MEQ/L (ref 3.5–5.2)
RBC # BLD: 4.82 MILL/MM3 (ref 4.2–5.4)
SEG NEUTROPHILS: 68.9 %
SEGMENTED NEUTROPHILS ABSOLUTE COUNT: 7.1 THOU/MM3 (ref 1.8–7.7)
SODIUM BLD-SCNC: 140 MEQ/L (ref 135–145)
TOTAL PROTEIN: 6.6 G/DL (ref 6.1–8)
TROPONIN T: < 0.01 NG/ML
TSH SERPL DL<=0.05 MIU/L-ACNC: 1.31 UIU/ML (ref 0.4–4.2)
VITAMIN B-12: 247 PG/ML (ref 211–911)
WBC # BLD: 10.3 THOU/MM3 (ref 4.8–10.8)

## 2022-11-21 PROCEDURE — 70450 CT HEAD/BRAIN W/O DYE: CPT

## 2022-11-21 PROCEDURE — 85379 FIBRIN DEGRADATION QUANT: CPT

## 2022-11-21 PROCEDURE — 84443 ASSAY THYROID STIM HORMONE: CPT

## 2022-11-21 PROCEDURE — 36415 COLL VENOUS BLD VENIPUNCTURE: CPT

## 2022-11-21 PROCEDURE — 82746 ASSAY OF FOLIC ACID SERUM: CPT

## 2022-11-21 PROCEDURE — 85025 COMPLETE CBC W/AUTO DIFF WBC: CPT

## 2022-11-21 PROCEDURE — 70551 MRI BRAIN STEM W/O DYE: CPT

## 2022-11-21 PROCEDURE — 71045 X-RAY EXAM CHEST 1 VIEW: CPT

## 2022-11-21 PROCEDURE — 70498 CT ANGIOGRAPHY NECK: CPT

## 2022-11-21 PROCEDURE — 83690 ASSAY OF LIPASE: CPT

## 2022-11-21 PROCEDURE — 84484 ASSAY OF TROPONIN QUANT: CPT

## 2022-11-21 PROCEDURE — 82607 VITAMIN B-12: CPT

## 2022-11-21 PROCEDURE — 86140 C-REACTIVE PROTEIN: CPT

## 2022-11-21 PROCEDURE — 80053 COMPREHEN METABOLIC PANEL: CPT

## 2022-11-21 PROCEDURE — 70496 CT ANGIOGRAPHY HEAD: CPT

## 2022-11-21 PROCEDURE — 6360000004 HC RX CONTRAST MEDICATION: Performed by: PHYSICIAN ASSISTANT

## 2022-11-21 PROCEDURE — 99285 EMERGENCY DEPT VISIT HI MDM: CPT

## 2022-11-21 RX ADMIN — IOPAMIDOL 80 ML: 755 INJECTION, SOLUTION INTRAVENOUS at 11:53

## 2022-11-21 ASSESSMENT — ENCOUNTER SYMPTOMS
SHORTNESS OF BREATH: 0
SORE THROAT: 0
VOMITING: 0
ABDOMINAL PAIN: 0
COUGH: 0
DIARRHEA: 0
NAUSEA: 0

## 2022-11-21 ASSESSMENT — PAIN - FUNCTIONAL ASSESSMENT: PAIN_FUNCTIONAL_ASSESSMENT: NONE - DENIES PAIN

## 2022-11-21 NOTE — ED PROVIDER NOTES
[facility] [address] [address] EMERGENCY DEPT    EMERGENCY MEDICINE     Pt Name: Ej Brennan  MRN: [id number]  [place] 1990  Date of evaluation: 11/21/2022  Provider: Randal Conn PA-C    CHIEF COMPLAINT       Chief Complaint   Patient presents with    Numbness     Left sided       HISTORY OF PRESENT ILLNESS    Elsa Casas is a pleasant 28 y.o. female who presents to the emergency department for left-sided numbness. Patient presents to the ED today stating that she has been having off-and-on numbness and tingling to the whole left side of her body from top of the head to her foot for the past 3 weeks. She states that it has been intermittent for the past 3 weeks but for the past 3 days it has been persistent and not going away. She became concerned when it started not going away. She also complains of intermittent chest pain for the past 3 weeks as well. She states that has not been persistent and currently not having chest pain. No complaints of shortness of breath during these times. Patient states she has history of palpitations in the past but has never had pain. Complains of bilateral ear pain for the past 3 months in which she has been following up with her PCP for this and recently been referred to ENT which she has not seen yet. She has been put on prednisone for the past 10 days which has not been helping. She was recently diagnosed with left ovarian cyst 5 x 5 cm found 3 days ago by her OB/GYN which she is following with. Taking Norco currently 4. No complaints of nausea, vomiting, diarrhea, fevers or chills. States the numbness and tingling is her biggest concern and currently is experiencing that. No complaints of weakness to her extremities. Complains of blurry vision patient that has been off and on since the numbness and tingling is started. No blackness to her vision. No complaints of dizziness or lightheadedness. Triage notes and Nursing notes were reviewed by myself.   Any discrepancies are addressed above. PAST MEDICAL HISTORY     Past Medical History:   Diagnosis Date    Heart abnormality     tachy during pregnancy    Infertility, female     took 1 round of clomid first preg    PONV (postoperative nausea and vomiting)     Postpartum depression     on meds post partum with 1st baby    Prolonged emergence from general anesthesia        SURGICAL HISTORY       Past Surgical History:   Procedure Laterality Date    CHOLECYSTECTOMY, LAPAROSCOPIC N/A 4/27/2020    CHOLECYSTECTOMY LAPAROSCOPIC ROBOTIC, performed by Beverly Lanes, MD at [facility]  12/2018    WISDOM TOOTH EXTRACTION      [place] [address]       Current Discharge Medication List        CONTINUE these medications which have NOT CHANGED    Details   Prenatal MV-Min-Fe Fum-FA-DHA (PRENATAL 1 PO) Take by mouth      metoprolol succinate (TOPROL XL) 25 MG extended release tablet Take 25 mg by mouth daily      acetaminophen (TYLENOL) 500 MG tablet Take 1,000 mg by mouth every 6 hours as needed for Pain      sertraline (ZOLOFT) 50 MG tablet Take 50 mg by mouth daily      Loratadine (CLARITIN PO) Take by mouth             ALLERGIES     Azithromycin, Flagyl [metronidazole], and Sulfa antibiotics    FAMILY HISTORY       Family History   Problem Relation Age of Onset    High Cholesterol Father     Breast Cancer Maternal Grandmother 43        SOCIAL HISTORY       Social History     Socioeconomic History    Marital status:     Number of children: 1   Tobacco Use    Smoking status: Never    Smokeless tobacco: Never   Vaping Use    Vaping Use: Never used   Substance and Sexual Activity    Alcohol use: No    Drug use: No    Sexual activity: Yes     Partners: Male       REVIEW OF SYSTEMS     Review of Systems   Constitutional:  Negative for chills and fever. HENT:  Negative for congestion, ear pain and sore throat. Eyes:  Positive for visual disturbance.    Respiratory:  Negative for cough and shortness of breath. Cardiovascular:  Positive for chest pain. Negative for palpitations. Gastrointestinal:  Negative for abdominal pain, diarrhea, nausea and vomiting. Endocrine: Negative. Genitourinary:  Negative for dysuria and frequency. Musculoskeletal:  Negative for myalgias and neck pain. Skin:  Negative for rash. Neurological:  Positive for numbness. Negative for dizziness, light-headedness and headaches. Hematological: Negative. Psychiatric/Behavioral: Negative. All other systems reviewed and are negative. Except as noted above the remainder of the review of systems was reviewed and is. SCREENINGS        Tello Coma Scale  Eye Opening: Spontaneous  Best Verbal Response: Oriented  Best Motor Response: Obeys commands  Tello Coma Scale Score: 15                 PHYSICAL EXAM     INITIAL VITALS:  height is 5' 6\" (1.676 m) and weight is 170 lb (77.1 kg). Her oral temperature is 98.3 °F (36.8 °C). Her blood pressure is 116/72 and her pulse is 85. Her respiration is 16 and oxygen saturation is 100%. Physical Exam  Vitals and nursing note reviewed. Constitutional:       General: She is not in acute distress. Appearance: Normal appearance. She is normal weight. She is not ill-appearing, toxic-appearing or diaphoretic. HENT:      Head: Normocephalic and atraumatic. Right Ear: External ear normal.      Left Ear: External ear normal.      Nose: Nose normal.      Mouth/Throat:      Mouth: Mucous membranes are moist.   Eyes:      General: No visual field deficit. Extraocular Movements: Extraocular movements intact. Pupils: Pupils are equal, round, and reactive to light. Cardiovascular:      Rate and Rhythm: Normal rate and regular rhythm. Pulses: Normal pulses. Heart sounds: Normal heart sounds. No murmur heard. Pulmonary:      Effort: Pulmonary effort is normal. No respiratory distress. Breath sounds: Normal breath sounds.    Abdominal: General: Bowel sounds are normal. There is no distension. Palpations: Abdomen is soft. Tenderness: There is no abdominal tenderness. Musculoskeletal:         General: Normal range of motion. Cervical back: Normal range of motion and neck supple. Skin:     General: Skin is warm and dry. Capillary Refill: Capillary refill takes less than 2 seconds. Neurological:      Mental Status: She is alert and oriented to person, place, and time. GCS: GCS eye subscore is 4. GCS verbal subscore is 5. GCS motor subscore is 6. Cranial Nerves: Cranial nerves 2-12 are intact. No cranial nerve deficit, dysarthria or facial asymmetry. Sensory: Sensory deficit present. Motor: Motor function is intact. Coordination: Coordination is intact. Deep Tendon Reflexes: Reflexes are normal and symmetric. Reflexes normal.      Comments: Subjective decrease sensation left half of body including head, left arm, chest and abdomen, back, leg and foot when compared to contralateral side. Psychiatric:         Mood and Affect: Mood normal.         Behavior: Behavior normal.         Thought Content: Thought content normal.       DIFFERENTIAL DIAGNOSIS:   Differential diagnoses are discussed    DIAGNOSTIC RESULTS     EKG:(none if blank)  All EKGs are interpreted by the Emergency Department Physician who either signs or Co-signs this chart in the absence of a cardiologist.          RADIOLOGY: (none if blank)   I directly visualized the following images and reviewed the radiologist interpretations. Interpretation per the Radiologist below, if available at the time of this note:  MRI BRAIN WO CONTRAST   Final Result       1. No acute findings. 2. There are just a few faint foci of signal hyperintensity in the subcortical white matter the brain. This could be due to chronic headaches, prior trauma or prior inflammation. **This report has been created using voice recognition software.  It may contain minor errors which are inherent in voice recognition technology. **      Final report electronically signed by Dr. Lee Ann Robledo on 11/21/2022 2:32 PM      CT Head W/O Contrast   Final Result    No evidence of an acute process. **This report has been created using voice recognition software. It may contain minor errors which are inherent in voice recognition technology. **      Final report electronically signed by Dr. Ruthie Anderson on 11/21/2022 12:14 PM      CTA HEAD W WO CONTRAST   Final Result    Normal CTA of the head and neck. **This report has been created using voice recognition software. It may contain minor errors which are inherent in voice recognition technology. **      Final report electronically signed by Dr. Lee Ann Robledo on 11/21/2022 12:24 PM      CTA NECK W WO CONTRAST   Final Result    Normal CTA of the head and neck. **This report has been created using voice recognition software. It may contain minor errors which are inherent in voice recognition technology. **      Final report electronically signed by Dr. Lee Ann Robledo on 11/21/2022 12:24 PM      XR CHEST PORTABLE   Final Result   1. No interval change since previous study dated 15th of November 2007, no acute cardiopulmonary disease. .               **This report has been created using voice recognition software. It may contain minor errors which are inherent in voice recognition technology. **      Final report electronically signed by DR Antwan Lauren on 11/21/2022 10:28 AM          LABS:   Labs Reviewed   COMPREHENSIVE METABOLIC PANEL - Abnormal; Notable for the following components:       Result Value    ALT <5 (*)     All other components within normal limits   CBC WITH AUTO DIFFERENTIAL   LIPASE   TROPONIN   D-DIMER, QUANTITATIVE   C-REACTIVE PROTEIN   GLOMERULAR FILTRATION RATE, ESTIMATED   ANION GAP   OSMOLALITY   VITAMIN B12 & FOLATE   TSH WITH REFLEX   VITAMIN B1       All other labs were within normal range or not returned as of this dictation. Please note, any cultures that may have been sent were not resulted at the time of this patient visit. EMERGENCY DEPARTMENT COURSE:   Vitals:    Vitals:    11/21/22 0925 11/21/22 1329   BP: 117/75 116/72   Pulse: (!) 110 85   Resp: 17 16   Temp: 98.3 °F (36.8 °C)    TempSrc: Oral    SpO2: 99% 100%   Weight: 170 lb (77.1 kg)    Height: 5' 6\" (1.676 m)      10:21 AM EST: The patient was seen and evaluated. PROCEDURES: (None if blank)  Procedures         ED Medications administered this visit:    Medications   iopamidol (ISOVUE-370) 76 % injection 80 mL (80 mLs IntraVENous Given 11/21/22 1153)       MDM:  Patient is 68-year-old female who came to the ED to be evaluated for numbness and tingling, chest pain. Appropriate testing/imaging of CBC, CMP, troponin, lipase, D-dimer, CRP, EKG, chest x-ray was done based on the patient's initial complaints, history, and physical exam.   Pertinent results were none. Discussed case with ED attending Dr. Diomedes Serrano who agreed with proceeding CT head without contrast and CTA head and neck with and without contrast for the progressive worsening left sided numbness and tingling. CT imaging Negative. Neurology consulted recommended additionally adding TSH, B1, B12, folate labs along with MRI without contrast for further evaluation. MRI demonstrated no acute findings and only faint foci of signal hypodensity in the subcortical white matter. Labs came back within normal limits. Neurology recommending with negative imaging and labs to follow-up outpatient with neurologist Dr. Alexx Ricketts in the next week. Patient's vitals are stable and afebrile. Patient understands and agrees to the treatment course. Patient was seen independently by myself. The patient's final impression and disposition and plan was determined by myself.       Strict return precautions and follow up instructions were discussed with the patient prior to discharge, with which the patient agrees. Physical assessment findings, diagnostic testing(s) if applicable, and vital signs reviewed with patient/patient representative. Questions answered. Medications as directed, including OTC medications for supportive care. Education provided on medications. Differential diagnosis(s) discussed with patient/patient representative. Home care/self care instructions reviewed with patient/patient representative. Patient is to follow-up with family care provider in 3 days if no improvement. Patient is to go to the emergency department if symptoms worsen. Patient/patient representative is aware of care plan, questions answered, verbalizes understanding and is in agreement. CRITICAL CARE:   None    CONSULTS:  None    PROCEDURES:  None    FINAL IMPRESSION      1. Paresthesias    2.  Atypical chest pain          DISPOSITION/PLAN   Discharge home    PATIENT REFERRED TO:  Leonard Witt, [address] [address]  [phone]    In 1 week  For re-evaluation    [facility] [address] EMERGENCY DEPT  [address] [address]  [phone]  In 2 days  If symptoms worsen    DISCHARGEMEDICATIONS:  Current Discharge Medication List               (Please note that portions of this note were completed with a voice recognition program.  Efforts were made to edit the dictations but occasionallywords are mis-transcribed.)      Asia Lange PA-C(electronically signed)  Physician Associate, Emergency Department        Asia Lange PA-C  11/21/22 8427

## 2022-11-21 NOTE — DISCHARGE INSTRUCTIONS
Recommend follow-up with neurology outpatient. Follow-up to the ED if worsening weakness to the extremities, fevers, headache, altered mental status as soon as possible.

## 2022-12-01 ENCOUNTER — OFFICE VISIT (OUTPATIENT)
Dept: NEUROLOGY | Age: 32
End: 2022-12-01
Payer: COMMERCIAL

## 2022-12-01 VITALS
HEIGHT: 66 IN | BODY MASS INDEX: 26.84 KG/M2 | OXYGEN SATURATION: 98 % | WEIGHT: 167 LBS | HEART RATE: 84 BPM | SYSTOLIC BLOOD PRESSURE: 104 MMHG | DIASTOLIC BLOOD PRESSURE: 62 MMHG

## 2022-12-01 DIAGNOSIS — R20.0 LEFT FACIAL NUMBNESS: ICD-10-CM

## 2022-12-01 DIAGNOSIS — R20.0 NUMBNESS: ICD-10-CM

## 2022-12-01 DIAGNOSIS — R20.0 LEFT LEG NUMBNESS: ICD-10-CM

## 2022-12-01 DIAGNOSIS — R20.0 LEFT SIDED NUMBNESS: Primary | ICD-10-CM

## 2022-12-01 PROCEDURE — 99205 OFFICE O/P NEW HI 60 MIN: CPT | Performed by: PSYCHIATRY & NEUROLOGY

## 2022-12-01 NOTE — PATIENT INSTRUCTIONS
MRI brain W/ contrast to complement MRI brain WO contrast done 11/21/22  MRI thoracic spine W/WO contrast  Will order connective tissue screen including KHAI, rheumatoid factor, antidouble-stranded DNA, anti-SSA, anti-SSB in addition to serum protein electrophoresis and sed rate. Vitamin B6 level  Vitamin D level  Lumbar puncture for diagnostic purposes  CSF to be sent for various cultures and studies  Start Vitamin B12 sublingual supplements at least 3000 mcg daily, over the counter  Call with any new symptoms or concerns. Follow up in 3-4 weeks.

## 2022-12-02 ENCOUNTER — TELEPHONE (OUTPATIENT)
Dept: NEUROLOGY | Age: 32
End: 2022-12-02

## 2022-12-02 ENCOUNTER — HOSPITAL ENCOUNTER (OUTPATIENT)
Age: 32
Discharge: HOME | End: 2022-12-02
Payer: COMMERCIAL

## 2022-12-02 DIAGNOSIS — R20.0 LEFT SIDED NUMBNESS: ICD-10-CM

## 2022-12-02 DIAGNOSIS — R20.0 NUMBNESS: ICD-10-CM

## 2022-12-02 DIAGNOSIS — R20.0 LEFT LEG NUMBNESS: ICD-10-CM

## 2022-12-02 DIAGNOSIS — R20.0 LEFT FACIAL NUMBNESS: ICD-10-CM

## 2022-12-02 LAB
C-REACTIVE PROTEIN: < 0.3 MG/DL (ref 0–1)
RHEUMATOID FACTOR: < 10 IU/ML (ref 0–13)
SEDIMENTATION RATE, ERYTHROCYTE: 2 MM/HR (ref 0–20)
VITAMIN D 25-HYDROXY: 22 NG/ML (ref 30–100)

## 2022-12-02 PROCEDURE — 84207 ASSAY OF VITAMIN B-6: CPT

## 2022-12-02 PROCEDURE — 85651 RBC SED RATE NONAUTOMATED: CPT

## 2022-12-02 PROCEDURE — 86225 DNA ANTIBODY NATIVE: CPT

## 2022-12-02 PROCEDURE — 86038 ANTINUCLEAR ANTIBODIES: CPT

## 2022-12-02 PROCEDURE — 82306 VITAMIN D 25 HYDROXY: CPT

## 2022-12-02 PROCEDURE — 86140 C-REACTIVE PROTEIN: CPT

## 2022-12-02 PROCEDURE — 84165 PROTEIN E-PHORESIS SERUM: CPT

## 2022-12-02 PROCEDURE — 36415 COLL VENOUS BLD VENIPUNCTURE: CPT

## 2022-12-02 PROCEDURE — 86235 NUCLEAR ANTIGEN ANTIBODY: CPT

## 2022-12-02 PROCEDURE — 86430 RHEUMATOID FACTOR TEST QUAL: CPT

## 2022-12-02 PROCEDURE — 84155 ASSAY OF PROTEIN SERUM: CPT

## 2022-12-02 NOTE — TELEPHONE ENCOUNTER
----- Message from KITA Kitchen CNP sent at 12/2/2022 12:25 PM EST -----  Please let patient know her vitamin D level is low=22.  Please ask her to follow up with her PCP regarding recommendations regarding this  Caribou Breaker, CNP

## 2022-12-04 LAB
ANA SCREEN: NORMAL
DSDNA ANTIBODY: NORMAL

## 2022-12-08 ENCOUNTER — TELEPHONE (OUTPATIENT)
Dept: NEUROLOGY | Age: 32
End: 2022-12-08

## 2022-12-08 NOTE — TELEPHONE ENCOUNTER
----- Message from KITA Gomez CNP sent at 12/7/2022 11:51 AM EST -----  Please let patient know her vitamin B6 level is low=17. Please ask her to start on vitamin B6 supplements, 50 mg daily, over the counter.   Please ask her to contact the office in 2-3 months for repeat vitamin B6 level  Luis Hobbs CNP

## 2022-12-09 ENCOUNTER — HOSPITAL ENCOUNTER (OUTPATIENT)
Dept: INTERVENTIONAL RADIOLOGY/VASCULAR | Age: 32
Discharge: HOME | End: 2022-12-09
Payer: COMMERCIAL

## 2022-12-09 VITALS
TEMPERATURE: 97 F | HEART RATE: 74 BPM | RESPIRATION RATE: 16 BRPM | SYSTOLIC BLOOD PRESSURE: 104 MMHG | OXYGEN SATURATION: 99 % | DIASTOLIC BLOOD PRESSURE: 60 MMHG

## 2022-12-09 DIAGNOSIS — R20.0 LEFT SIDED NUMBNESS: ICD-10-CM

## 2022-12-09 DIAGNOSIS — R20.0 LEFT LEG NUMBNESS: ICD-10-CM

## 2022-12-09 DIAGNOSIS — R20.0 LEFT FACIAL NUMBNESS: ICD-10-CM

## 2022-12-09 DIAGNOSIS — R20.0 NUMBNESS: ICD-10-CM

## 2022-12-09 LAB
CHARACTER, CSF: ABNORMAL
CHARACTER, CSF: ABNORMAL
COLOR CSF: ABNORMAL
COLOR CSF: ABNORMAL
CRYPTOCOCCUS NEOFORMANS/GATTI CSF FILM ARR.: NOT DETECTED
CYTOMEGALOVIRUS (CMV) CSF FILM ARRAY: NOT DETECTED
ENTEROVIRUS DETECTION PCR: NOT DETECTED
EOS CSF: 3 %
ESCHERICHIA COLI K1 CSF FILM ARRAY: NOT DETECTED
GLUCOSE, CSF: 55 MG/DL (ref 40–80)
HAEMOPHILUS INFLUENZA CSF FILM ARRAY: NOT DETECTED
HERPES SIMPLEX VIRUS SUBTYPE SOURCE: NORMAL
HHV-6 (HERPESVIRUS 6) CSF FILM ARRAY: NOT DETECTED
HSV-1 CSF FILM ARRAY: NOT DETECTED
HSV-2 CSF FILM ARRAY: NOT DETECTED
LISTERIA MONOCYTOGENES CSF FILM ARRAY: NOT DETECTED
LYMPHS CSF: 44 % (ref 0–90)
LYMPHS CSF: 60 % (ref 0–90)
MONOCYTE, CSF: 1 % (ref 0–45)
MONOCYTE, CSF: 4 % (ref 0–45)
NEISSERIA MENIGITIDIS CSF FILM ARRAY: NOT DETECTED
PARECHOVIRUS CSF FILM ARRAY: NOT DETECTED
PATHOLOGIST REVIEW: ABNORMAL
PATHOLOGIST REVIEW: ABNORMAL
PROTEIN CSF: 43 MG/DL (ref 12–60)
RBC CSF: 210 /CUMM
RBC CSF: ABNORMAL /CUMM
SEGS, CSF: 36 % (ref 0–6)
SEGS, CSF: 52 % (ref 0–6)
STREPTOCOCCUS AGALACTIAE CSF FILM ARRAY: NOT DETECTED
STREPTOCOCCUS PNEUMONIAE CSF FILM ARRAY: NOT DETECTED
TOTAL NUCLEATED CELLS CSF: 10 /CUMM (ref 0–5)
TOTAL NUCLEATED CELLS CSF: ABNORMAL /CUMM (ref 0–5)
TUBE VOLUME RECEIVED CSF: 1 ML
TUBE VOLUME RECEIVED CSF: 2 ML
VARICELLA-ZOSTER, PCR: NOT DETECTED

## 2022-12-09 PROCEDURE — 87798 DETECT AGENT NOS DNA AMP: CPT

## 2022-12-09 PROCEDURE — 82164 ANGIOTENSIN I ENZYME TEST: CPT

## 2022-12-09 PROCEDURE — 62328 DX LMBR SPI PNXR W/FLUOR/CT: CPT

## 2022-12-09 PROCEDURE — 89051 BODY FLUID CELL COUNT: CPT

## 2022-12-09 PROCEDURE — 2580000003 HC RX 258: Performed by: RADIOLOGY

## 2022-12-09 PROCEDURE — 84157 ASSAY OF PROTEIN OTHER: CPT

## 2022-12-09 PROCEDURE — 86618 LYME DISEASE ANTIBODY: CPT

## 2022-12-09 PROCEDURE — 86592 SYPHILIS TEST NON-TREP QUAL: CPT

## 2022-12-09 PROCEDURE — 88108 CYTOPATH CONCENTRATE TECH: CPT

## 2022-12-09 PROCEDURE — 82945 GLUCOSE OTHER FLUID: CPT

## 2022-12-09 RX ORDER — SODIUM CHLORIDE 450 MG/100ML
INJECTION, SOLUTION INTRAVENOUS CONTINUOUS
Status: DISCONTINUED | OUTPATIENT
Start: 2022-12-09 | End: 2022-12-10 | Stop reason: HOSPADM

## 2022-12-09 RX ADMIN — SODIUM CHLORIDE: 4.5 INJECTION, SOLUTION INTRAVENOUS at 07:05

## 2022-12-09 ASSESSMENT — PAIN DESCRIPTION - DESCRIPTORS
DESCRIPTORS: NUMBNESS
DESCRIPTORS: ACHING

## 2022-12-09 ASSESSMENT — PAIN SCALES - GENERAL
PAINLEVEL_OUTOF10: 4
PAINLEVEL_OUTOF10: 4

## 2022-12-09 ASSESSMENT — PAIN - FUNCTIONAL ASSESSMENT: PAIN_FUNCTIONAL_ASSESSMENT: 0-10

## 2022-12-09 ASSESSMENT — PAIN DESCRIPTION - LOCATION: LOCATION: BACK

## 2022-12-09 NOTE — DISCHARGE INSTRUCTIONS
LUMBAR PUNCTURE DISCHARGE CARE    1. Drink extra liquids today. 2.  Minimal activity for the remainder of the day. Go home and rest.  No driving today. 3.  You may get a headache after the Lumbar Puncture. Lying down usually lessens the headache. You may take Tylenol for the headache. 4.  Keep puncture site lower back clean and dry for 24 hours. Cover area with a band- aid for 1 - 2 days. Call your doctor if:  You have a headache lasting longer than 24 hours. Bleeding other than a small spot on the band-aid from puncture site. Seek care immediately by going to the nearest Emergency Room if:  You have shaking, chills or temperature over 101 deg F. You have severe headache. You have numbness, weakness,or tingling in your legs or change in mental alertness.     Any questions call your physician or [facility] or [phone]

## 2022-12-09 NOTE — PROGRESS NOTES
5383 pt admitted to opn per ambulation with , kylie for a lumbar puncture. Pt denies use of any blood thinners. Npo since last kady PT RIGHTS AND PT RESPONSIBILITIES OFFERED TO PT. PT STATES SHE HAS NUMBNESS TO ENTIRE LEFT SIDE OF FACE, ARM, TRUNK, LEG   . DENIES PAIN  . Hand grasp equal and strong bilaterally  0700 lab sent. 0720 report called to [facility], [place]  0740 pt taken per cart to specials   0900 PT return to opn per cart. Bandaid to lower back dry and intact. Pedal push and pull equal and strong. Bilaterally. Pt states some discomfort at the procedure site. Pt in supine position. No new numbness noted. 0930 taking pop and muffin in   0945  pt states she is still having some discomfort at site. Offered to get pain pill but patient states she does not want a pain pill at present. 1030 up to bathroom. Pt states the discomfort is a little better . Pt states pain is subsiding some but having a little nausea when up    1045 resting quielty in bed. Flat. 1050 clothes changed. Myriam well. Bandaid dry and intact to lower back area. 1055 discharge instructions given to patient and . Verbalize understanding of home going instructions. 1115 discharge per wheelchair to main entrance. Pedal push and pull equal and strong. Moving x 4. Bandaid dry and intact. No bleeding, swelling noted. Pt states some discomfort but if needed, will take tylenol if needed.                       _m___ Safety:       (Environmental)  [place] to environment  Ensure ID band is correct and in place/ allergy band as needed  Assess for fall risk  Initiate fall precautions as applicable (fall band, side rails, etc.)  Call light within reach  Bed in low position/ wheels locked    __m__ Pain:       Assess pain level and characteristics  Administer analgesics as ordered  Assess effectiveness of pain management and report to MD as needed    ___m_ Knowledge Deficit:  Assess baseline knowledge  Provide teaching at level of

## 2022-12-09 NOTE — PROGRESS NOTES
0411 Patient received in IR for lumbar puncture. 8602 This procedure has been fully reviewed with the patient and written informed consent has been obtained. 7663 Procedure started with Dr. Consuelo Skiff. 7861 Procedure completed; patient tolerated well. Band aid to lower back; no bleeding noted. 4727 Patient on cart; comfort ensured. 8200 Patient taken to OPN via cart.

## 2022-12-09 NOTE — OP NOTE
Department of Radiology  Post Procedure Progress Note      Pre-Procedure Diagnosis:  Numbness    Procedure Performed:  Lumbar puncture    Anesthesia: local     Findings: successful    Immediate Complications:  None    Estimated Blood Loss: minimal    SEE DICTATED PROCEDURE NOTE FOR COMPLETE DETAILS.     Electronically signed by Laurel Padilla MD on 12/9/2022 at 8:42 AM

## 2022-12-11 ENCOUNTER — HOSPITAL ENCOUNTER (EMERGENCY)
Age: 32
Discharge: HOME | End: 2022-12-11
Attending: EMERGENCY MEDICINE
Payer: COMMERCIAL

## 2022-12-11 ENCOUNTER — APPOINTMENT (OUTPATIENT)
Dept: INTERVENTIONAL RADIOLOGY/VASCULAR | Age: 32
End: 2022-12-11
Payer: COMMERCIAL

## 2022-12-11 VITALS
TEMPERATURE: 98.2 F | WEIGHT: 170 LBS | DIASTOLIC BLOOD PRESSURE: 78 MMHG | RESPIRATION RATE: 18 BRPM | OXYGEN SATURATION: 100 % | SYSTOLIC BLOOD PRESSURE: 124 MMHG | HEIGHT: 66 IN | BODY MASS INDEX: 27.32 KG/M2 | HEART RATE: 64 BPM

## 2022-12-11 DIAGNOSIS — G97.1 HEADACHE, POST-LUMBAR PUNCTURE: Primary | ICD-10-CM

## 2022-12-11 PROCEDURE — 2709999900 IR FLUORO GUIDED LUMBAR PUNCTURE THERAPY

## 2022-12-11 PROCEDURE — 2580000003 HC RX 258

## 2022-12-11 PROCEDURE — 77003 FLUOROGUIDE FOR SPINE INJECT: CPT

## 2022-12-11 PROCEDURE — 6360000002 HC RX W HCPCS

## 2022-12-11 PROCEDURE — 62273 INJECT EPIDURAL PATCH: CPT

## 2022-12-11 PROCEDURE — 96372 THER/PROPH/DIAG INJ SC/IM: CPT

## 2022-12-11 PROCEDURE — 99284 EMERGENCY DEPT VISIT MOD MDM: CPT

## 2022-12-11 PROCEDURE — 6360000004 HC RX CONTRAST MEDICATION: Performed by: RADIOLOGY

## 2022-12-11 PROCEDURE — 96374 THER/PROPH/DIAG INJ IV PUSH: CPT

## 2022-12-11 PROCEDURE — 96375 TX/PRO/DX INJ NEW DRUG ADDON: CPT

## 2022-12-11 RX ORDER — DROPERIDOL 2.5 MG/ML
1.25 INJECTION, SOLUTION INTRAMUSCULAR; INTRAVENOUS EVERY 6 HOURS PRN
Status: DISCONTINUED | OUTPATIENT
Start: 2022-12-11 | End: 2022-12-11 | Stop reason: HOSPADM

## 2022-12-11 RX ORDER — 0.9 % SODIUM CHLORIDE 0.9 %
1000 INTRAVENOUS SOLUTION INTRAVENOUS ONCE
Status: COMPLETED | OUTPATIENT
Start: 2022-12-11 | End: 2022-12-11

## 2022-12-11 RX ORDER — KETOROLAC TROMETHAMINE 30 MG/ML
30 INJECTION, SOLUTION INTRAMUSCULAR; INTRAVENOUS ONCE
Status: COMPLETED | OUTPATIENT
Start: 2022-12-11 | End: 2022-12-11

## 2022-12-11 RX ORDER — LORAZEPAM 2 MG/ML
0.5 INJECTION INTRAMUSCULAR ONCE
Status: COMPLETED | OUTPATIENT
Start: 2022-12-11 | End: 2022-12-11

## 2022-12-11 RX ORDER — CAFFEINE 200 MG
200 TABLET ORAL EVERY 4 HOURS PRN
Status: DISCONTINUED | OUTPATIENT
Start: 2022-12-11 | End: 2022-12-11 | Stop reason: HOSPADM

## 2022-12-11 RX ADMIN — KETOROLAC TROMETHAMINE 30 MG: 30 INJECTION, SOLUTION INTRAMUSCULAR; INTRAVENOUS at 10:55

## 2022-12-11 RX ADMIN — IOHEXOL 1 ML: 180 INJECTION INTRAVENOUS at 13:36

## 2022-12-11 RX ADMIN — DROPERIDOL 1.25 MG: 2.5 INJECTION, SOLUTION INTRAMUSCULAR; INTRAVENOUS at 10:57

## 2022-12-11 RX ADMIN — LORAZEPAM 0.5 MG: 2 INJECTION INTRAMUSCULAR; INTRAVENOUS at 13:00

## 2022-12-11 RX ADMIN — SODIUM CHLORIDE 1000 ML: 9 INJECTION, SOLUTION INTRAVENOUS at 10:56

## 2022-12-11 ASSESSMENT — PAIN SCALES - GENERAL
PAINLEVEL_OUTOF10: 0
PAINLEVEL_OUTOF10: 8

## 2022-12-11 ASSESSMENT — ENCOUNTER SYMPTOMS
VOMITING: 0
DIARRHEA: 0
SHORTNESS OF BREATH: 0
BACK PAIN: 0
PHOTOPHOBIA: 1
CONSTIPATION: 0
NAUSEA: 1
EYE PAIN: 0
CHEST TIGHTNESS: 0
BLOOD IN STOOL: 0

## 2022-12-11 ASSESSMENT — PAIN - FUNCTIONAL ASSESSMENT
PAIN_FUNCTIONAL_ASSESSMENT: NONE - DENIES PAIN
PAIN_FUNCTIONAL_ASSESSMENT: 0-10

## 2022-12-11 ASSESSMENT — PAIN DESCRIPTION - LOCATION: LOCATION: HEAD

## 2022-12-11 ASSESSMENT — PAIN DESCRIPTION - DESCRIPTORS: DESCRIPTORS: PRESSURE

## 2022-12-11 NOTE — DISCHARGE INSTRUCTIONS
He reported significant improvement after your blood patch procedure. Please follow-up your primary care doctor following your visit today. As with any procedure that punctured the skin you are at risk of developing an infection or bleeding. Please return to the emergency department if you develop any new onset numbness, weakness, bladder or bowel incontinence or development of any fevers not controlled by Tylenol as these can be signs of infection or bleed.

## 2022-12-11 NOTE — ED NOTES
RN medicated per MAR. Dimmed lights and closed door for patient comfort.       Som Tay RN  12/11/22 4813

## 2022-12-11 NOTE — PROGRESS NOTES
200 Pt in specials radiology for blood patch. Dr Emily Bah explained procedure to pt and pt verbalizes understanding. Consent signed. Pt denies any pain while lying flat. 1320 Pt positioned on table in prone position. 1336 Blood patch complete with 7 ml's blood. Pt tolerated well. 1340 Pt positioned on cart for comfort. Offers no complaints. 1343 Pt transferred to ED per cart. Report to RN.

## 2022-12-11 NOTE — ED NOTES
Pt return from IR. Pt denies HA. Pt respirations are even and unlabored.       Clare Moreno RN  12/11/22 3077

## 2022-12-11 NOTE — ED TRIAGE NOTES
Pt presents to the ED with complaints of a HA. Pt had LP on 12/9/22 due to left sided paralysis. Pt states she woke up with 8/10 HA. Pt requesting blood patch. Pt states the is having vision problems. Pt respirations are even and unlabored.

## 2022-12-11 NOTE — ED PROVIDER NOTES
[address] [address]          Pt Name: Della Yancey  MRN: [id number]  [place] 1990  Date of evaluation: 12/11/2022  Treating Resident Physician: Rut Kramer MD  Supervising Physician: Dr. Magdi Pleitez    History obtained from the patient. CHIEF COMPLAINT       Chief Complaint   Patient presents with    Headache    Post-op Problem           HISTORY OF PRESENT ILLNESS    HPI  Elsa Lowery is a 28 y.o. female who presents to the emergency department for evaluation of persistent headache    Patient says that she is having a diffuse headache unlike her normal migraines. Patient has had migraine is usually on the right side of her face however this feels like a throbbing sensation all over her head. Mildly improved when lying down and worse when upright and moving. Associate with photophobia without any photophobia. Patient says she is not feeling any neck stiffness and has not having any other neurological symptoms including, weakness, lightheadedness, dizziness, syncope, bladder or bowel incontinence. Patient notes that she did just have a lumbar puncture performed on Friday to diagnose some left-sided abnormal sensations that she has been having for several months. Patient states that since the procedure she has had the same headache. Patient was advised to take caffeine and hydrate at home and if that did not help to come to the emergency department for a blood patch. Patient has no history of cancers never been on chemotherapy. Patient's not had any recent surgeries. Patient is a non-smoker is not taking any estrogen containing products. The patient has no other acute complaints at this time. REVIEW OF SYSTEMS   Review of Systems   Constitutional:  Negative for chills, fatigue, fever and unexpected weight change. HENT:  Negative for ear pain and hearing loss. Eyes:  Positive for photophobia.  Negative for pain and visual disturbance. Respiratory:  Negative for chest tightness and shortness of breath. Cardiovascular:  Negative for chest pain and leg swelling. Gastrointestinal:  Positive for nausea. Negative for blood in stool, constipation, diarrhea and vomiting. Endocrine: Negative for cold intolerance and heat intolerance. Genitourinary:  Negative for difficulty urinating, dysuria, hematuria and vaginal bleeding. Musculoskeletal:  Negative for arthralgias and back pain. Neurological:  Positive for headaches. Negative for dizziness, light-headedness and numbness. Psychiatric/Behavioral:  Negative for agitation, confusion and sleep disturbance.         PAST MEDICAL AND SURGICAL HISTORY     Past Medical History:   Diagnosis Date    Heart abnormality     tachy during pregnancy    Infertility, female     took 1 round of clomid first preg    PONV (postoperative nausea and vomiting)     Postpartum depression     on meds post partum with 1st baby    Prolonged emergence from general anesthesia      Past Surgical History:   Procedure Laterality Date    CHOLECYSTECTOMY, LAPAROSCOPIC N/A 4/27/2020    CHOLECYSTECTOMY LAPAROSCOPIC ROBOTIC, performed by Rafiq Banks MD at [address]  12/2018    WISDOM TOOTH EXTRACTION      [place] in [address] [facility]     Current Facility-Administered Medications:     droperidol (INAPSINE) injection 1.25 mg, 1.25 mg, IntraMUSCular, Q6H PRN, Nataly Amador MD, 1.25 mg at 12/11/22 1057    Caffeine (VIVARIN) TABS 200 mg, 200 mg, Oral, Q4H PRN, Nataly Amador MD    Current Outpatient Medications:     Cyanocobalamin (VITAMIN B 12 PO), Take by mouth, Disp: , Rfl:     VITAMIN D PO, Take by mouth, Disp: , Rfl:     Prenatal MV-Min-Fe Fum-FA-DHA (PRENATAL 1 PO), Take by mouth (Patient not taking: No sig reported), Disp: , Rfl:     metoprolol succinate (TOPROL XL) 25 MG extended release tablet, Take 25 mg by mouth daily (Patient not taking: No sig reported), Disp: , Rfl: acetaminophen (TYLENOL) 500 MG tablet, Take 1,000 mg by mouth every 6 hours as needed for Pain, Disp: , Rfl:     sertraline (ZOLOFT) 50 MG tablet, Take 50 mg by mouth daily, Disp: , Rfl:     Loratadine (CLARITIN PO), Take by mouth, Disp: , Rfl:       SOCIAL HISTORY     Social History     Social History Narrative    Not on file     Social History     Tobacco Use    Smoking status: Never    Smokeless tobacco: Never   Vaping Use    Vaping Use: Never used   Substance Use Topics    Alcohol use: No    Drug use: No         ALLERGIES     Allergies   Allergen Reactions    Azithromycin Hives     z pack-rash    Flagyl [Metronidazole] Hives    Sulfa Antibiotics Hives     Reaction as a kid           FAMILY HISTORY     Family History   Problem Relation Age of Onset    High Cholesterol Father     Breast Cancer Maternal Grandmother 43         PREVIOUS RECORDS   Previous records reviewed: Patient was last seen in the emergency department on November 21, 2022 for paresthesias        PHYSICAL EXAM     ED Triage Vitals [12/11/22 1023]   BP Temp Temp Source Heart Rate Resp SpO2 Height Weight   113/70 98.2 °F (36.8 °C) Oral 97 18 96 % 5' 6\" (1.676 m) 170 lb (77.1 kg)     Initial vital signs and nursing assessment reviewed and normal. Body mass index is 27.44 kg/m². Pulsoximetry is normal per my interpretation. Additional Vital Signs:  Vitals:    12/11/22 1503   BP: 124/78   Pulse: 64   Resp: 18   Temp:    SpO2: 100%       Physical Exam  Vitals and nursing note reviewed. Constitutional:       Appearance: She is obese. HENT:      Head: Normocephalic and atraumatic. Mouth/Throat:      Mouth: Mucous membranes are moist.      Pharynx: Oropharynx is clear. Eyes:      Extraocular Movements: Extraocular movements intact. Right eye: No nystagmus. Left eye: No nystagmus. Pupils: Pupils are equal, round, and reactive to light. Neck:      Meningeal: Brudzinski's sign and Kernig's sign absent.    Cardiovascular: Rate and Rhythm: Normal rate and regular rhythm. Pulses: Normal pulses. Heart sounds: Normal heart sounds. Pulmonary:      Effort: Pulmonary effort is normal.      Breath sounds: Normal breath sounds. Abdominal:      General: Bowel sounds are normal.      Palpations: Abdomen is soft. Musculoskeletal:      Cervical back: Normal range of motion and neck supple. Skin:     General: Skin is warm and dry. Neurological:      General: No focal deficit present. Mental Status: She is alert and oriented to person, place, and time. GCS: GCS eye subscore is 4. GCS verbal subscore is 5. GCS motor subscore is 6. Cranial Nerves: Cranial nerves 2-12 are intact. Sensory: Sensory deficit (reports sensation on left decreased from right but intact, diffuse across the body.) present. Motor: Motor function is intact. Coordination: Coordination is intact. MEDICAL DECISION MAKING   Initial Assessment:   70-year-old female presenting with persistent headache after lumbar puncture. Photophobia present. Kernig and Brudzinski negative. Suspect patient is suffering from post dural puncture headache. Patient's back is without tenderness warmth erythema or pus. Differential diagnosis does include epidural abscess, epidural hematoma, meningitis, headache, migraine headache. These are much less likely given the patient's history and exam findings as documented above. Plan:   Droperidol, Toradol, IV fluids, interventional radiology for blood patch        ED RESULTS   Laboratory results:  Labs Reviewed - No data to display      Radiologic studies results:  IR FLUORO GUIDED LUMBAR PUNCTURE THERAPY   Final Result   Status post successful lumbar epidural blood patch procedure. **This report has been created using voice recognition software. It may contain minor errors which are inherent in voice recognition technology. **      Final report electronically signed by Dr. Isael Jacinto Akhil on 12/11/2022 2:00 PM          ED Medications administered this visit:   Medications   droperidol (INAPSINE) injection 1.25 mg (1.25 mg IntraMUSCular Given 12/11/22 1057)   Caffeine (VIVARIN) TABS 200 mg (has no administration in time range)   ketorolac (TORADOL) injection 30 mg (30 mg IntraVENous Given 12/11/22 1055)   0.9 % sodium chloride bolus (0 mLs IntraVENous Stopped 12/11/22 1156)   LORazepam (ATIVAN) injection 0.5 mg (0.5 mg IntraVENous Given 12/11/22 1300)   iohexol (OMNIPAQUE 180) injection 1 mL (1 mL Epidural Given by Other 12/11/22 1336)         ED COURSE     ED Course as of 12/11/22 1534   Sun Dec 11, 2022   1416 Patient did receive blood patch without any complications. Patient has reported significant improvement status post procedure. Patient was observed in the emergency department for 1 hour prior to being discharged. Shared decision-making was performed and the patient agrees that she is suitable for discharge. Patient was advised to return the emergency department if she develops any new neurological symptoms including weakness, saddle anesthesia, loss of bladder or bowel function, fevers, chills is easily signs of a serious infection which is a possible complication procedure she underwent today. Patient expressed understanding and is agreement plan. [LISSA]      ED Course User Index  [LISSA] Dione Russell MD       Strict return precautions and follow up instructions were discussed with the patient prior to discharge, with which the patient agrees. MEDICATION CHANGES     Discharge Medication List as of 12/11/2022  2:58 PM            FINAL DISPOSITION     Final diagnoses:   Headache, post-lumbar puncture     Condition: condition: stable  Dispo: Discharge to home      This transcription was electronically signed.  Parts of this transcriptions may have been dictated by use of voice recognition software and electronically transcribed, and parts may have been transcribed with the assistance of an ED scribe. The transcription may contain errors not detected in proofreading. Please refer to my supervising physician's documentation if my documentation differs.     Electronically Signed: Yanique Herrera MD, 12/11/22, 3:34 PM          Eduardo Valencia MD  Resident  12/11/22 4803

## 2022-12-13 ENCOUNTER — TELEPHONE (OUTPATIENT)
Dept: NEUROLOGY | Age: 32
End: 2022-12-13

## 2022-12-13 NOTE — TELEPHONE ENCOUNTER
Alyson with [address] [facility] lab called stating they are unable to run CSF Lyme because it had blood in the sample and sample was hemolyzed.

## 2022-12-15 ENCOUNTER — HOSPITAL ENCOUNTER (OUTPATIENT)
Age: 32
Setting detail: OUTPATIENT SURGERY
Discharge: HOME | End: 2022-12-15
Attending: OBSTETRICS & GYNECOLOGY | Admitting: OBSTETRICS & GYNECOLOGY
Payer: COMMERCIAL

## 2022-12-15 ENCOUNTER — ANESTHESIA EVENT (OUTPATIENT)
Dept: OPERATING ROOM | Age: 32
End: 2022-12-15
Payer: COMMERCIAL

## 2022-12-15 ENCOUNTER — HOSPITAL ENCOUNTER (OUTPATIENT)
Dept: MRI IMAGING | Age: 32
Discharge: HOME | End: 2022-12-15
Payer: COMMERCIAL

## 2022-12-15 ENCOUNTER — ANESTHESIA (OUTPATIENT)
Dept: OPERATING ROOM | Age: 32
End: 2022-12-15
Payer: COMMERCIAL

## 2022-12-15 VITALS
SYSTOLIC BLOOD PRESSURE: 122 MMHG | DIASTOLIC BLOOD PRESSURE: 58 MMHG | HEIGHT: 66 IN | WEIGHT: 176 LBS | OXYGEN SATURATION: 98 % | BODY MASS INDEX: 28.28 KG/M2 | HEART RATE: 98 BPM | RESPIRATION RATE: 18 BRPM | TEMPERATURE: 97.1 F

## 2022-12-15 DIAGNOSIS — R20.0 LEFT FACIAL NUMBNESS: ICD-10-CM

## 2022-12-15 DIAGNOSIS — R20.0 NUMBNESS: ICD-10-CM

## 2022-12-15 DIAGNOSIS — N83.292 OTHER OVARIAN CYST, LEFT SIDE: ICD-10-CM

## 2022-12-15 DIAGNOSIS — R20.0 LEFT LEG NUMBNESS: ICD-10-CM

## 2022-12-15 DIAGNOSIS — R20.0 LEFT SIDED NUMBNESS: ICD-10-CM

## 2022-12-15 LAB — PREGNANCY, URINE: NEGATIVE

## 2022-12-15 PROCEDURE — 7100000010 HC PHASE II RECOVERY - FIRST 15 MIN: Performed by: OBSTETRICS & GYNECOLOGY

## 2022-12-15 PROCEDURE — 81025 URINE PREGNANCY TEST: CPT

## 2022-12-15 PROCEDURE — 2580000003 HC RX 258: Performed by: OBSTETRICS & GYNECOLOGY

## 2022-12-15 PROCEDURE — 72157 MRI CHEST SPINE W/O & W/DYE: CPT

## 2022-12-15 PROCEDURE — 2500000003 HC RX 250 WO HCPCS: Performed by: NURSE ANESTHETIST, CERTIFIED REGISTERED

## 2022-12-15 PROCEDURE — 6360000004 HC RX CONTRAST MEDICATION: Performed by: NURSE PRACTITIONER

## 2022-12-15 PROCEDURE — 7100000011 HC PHASE II RECOVERY - ADDTL 15 MIN: Performed by: OBSTETRICS & GYNECOLOGY

## 2022-12-15 PROCEDURE — 6360000002 HC RX W HCPCS: Performed by: NURSE ANESTHETIST, CERTIFIED REGISTERED

## 2022-12-15 PROCEDURE — 2720000010 HC SURG SUPPLY STERILE: Performed by: OBSTETRICS & GYNECOLOGY

## 2022-12-15 PROCEDURE — 88305 TISSUE EXAM BY PATHOLOGIST: CPT

## 2022-12-15 PROCEDURE — 2709999900 HC NON-CHARGEABLE SUPPLY: Performed by: OBSTETRICS & GYNECOLOGY

## 2022-12-15 PROCEDURE — 3600000003 HC SURGERY LEVEL 3 BASE: Performed by: OBSTETRICS & GYNECOLOGY

## 2022-12-15 PROCEDURE — 3700000001 HC ADD 15 MINUTES (ANESTHESIA): Performed by: OBSTETRICS & GYNECOLOGY

## 2022-12-15 PROCEDURE — 3600000013 HC SURGERY LEVEL 3 ADDTL 15MIN: Performed by: OBSTETRICS & GYNECOLOGY

## 2022-12-15 PROCEDURE — 6370000000 HC RX 637 (ALT 250 FOR IP)

## 2022-12-15 PROCEDURE — 7100000000 HC PACU RECOVERY - FIRST 15 MIN: Performed by: OBSTETRICS & GYNECOLOGY

## 2022-12-15 PROCEDURE — A9579 GAD-BASE MR CONTRAST NOS,1ML: HCPCS | Performed by: NURSE PRACTITIONER

## 2022-12-15 PROCEDURE — 7100000001 HC PACU RECOVERY - ADDTL 15 MIN: Performed by: OBSTETRICS & GYNECOLOGY

## 2022-12-15 PROCEDURE — 3700000000 HC ANESTHESIA ATTENDED CARE: Performed by: OBSTETRICS & GYNECOLOGY

## 2022-12-15 PROCEDURE — 2580000003 HC RX 258: Performed by: NURSE ANESTHETIST, CERTIFIED REGISTERED

## 2022-12-15 PROCEDURE — 2500000003 HC RX 250 WO HCPCS: Performed by: OBSTETRICS & GYNECOLOGY

## 2022-12-15 PROCEDURE — 70553 MRI BRAIN STEM W/O & W/DYE: CPT

## 2022-12-15 PROCEDURE — 6370000000 HC RX 637 (ALT 250 FOR IP): Performed by: OBSTETRICS & GYNECOLOGY

## 2022-12-15 PROCEDURE — 6360000002 HC RX W HCPCS: Performed by: ANESTHESIOLOGY

## 2022-12-15 RX ORDER — SCOLOPAMINE TRANSDERMAL SYSTEM 1 MG/1
1 PATCH, EXTENDED RELEASE TRANSDERMAL ONCE
Status: DISCONTINUED | OUTPATIENT
Start: 2022-12-15 | End: 2022-12-15 | Stop reason: HOSPADM

## 2022-12-15 RX ORDER — KETOROLAC TROMETHAMINE 30 MG/ML
INJECTION, SOLUTION INTRAMUSCULAR; INTRAVENOUS PRN
Status: DISCONTINUED | OUTPATIENT
Start: 2022-12-15 | End: 2022-12-15 | Stop reason: SDUPTHER

## 2022-12-15 RX ORDER — KETOROLAC TROMETHAMINE 30 MG/ML
30 INJECTION, SOLUTION INTRAMUSCULAR; INTRAVENOUS ONCE
Status: DISCONTINUED | OUTPATIENT
Start: 2022-12-15 | End: 2022-12-15 | Stop reason: HOSPADM

## 2022-12-15 RX ORDER — HYDRALAZINE HYDROCHLORIDE 20 MG/ML
10 INJECTION INTRAMUSCULAR; INTRAVENOUS
Status: DISCONTINUED | OUTPATIENT
Start: 2022-12-15 | End: 2022-12-15 | Stop reason: HOSPADM

## 2022-12-15 RX ORDER — SODIUM CHLORIDE, SODIUM LACTATE, POTASSIUM CHLORIDE, CALCIUM CHLORIDE 600; 310; 30; 20 MG/100ML; MG/100ML; MG/100ML; MG/100ML
INJECTION, SOLUTION INTRAVENOUS CONTINUOUS PRN
Status: DISCONTINUED | OUTPATIENT
Start: 2022-12-15 | End: 2022-12-15 | Stop reason: SDUPTHER

## 2022-12-15 RX ORDER — IBUPROFEN 800 MG/1
800 TABLET ORAL EVERY 8 HOURS PRN
Status: DISCONTINUED | OUTPATIENT
Start: 2022-12-15 | End: 2022-12-15 | Stop reason: HOSPADM

## 2022-12-15 RX ORDER — PYRIDOXINE HCL (VITAMIN B6) 100 MG
50 TABLET ORAL DAILY
COMMUNITY

## 2022-12-15 RX ORDER — SODIUM CHLORIDE 0.9 % (FLUSH) 0.9 %
5-40 SYRINGE (ML) INJECTION EVERY 12 HOURS SCHEDULED
Status: DISCONTINUED | OUTPATIENT
Start: 2022-12-15 | End: 2022-12-15 | Stop reason: HOSPADM

## 2022-12-15 RX ORDER — SODIUM CHLORIDE, SODIUM LACTATE, POTASSIUM CHLORIDE, CALCIUM CHLORIDE 600; 310; 30; 20 MG/100ML; MG/100ML; MG/100ML; MG/100ML
INJECTION, SOLUTION INTRAVENOUS CONTINUOUS
Status: DISCONTINUED | OUTPATIENT
Start: 2022-12-15 | End: 2022-12-15 | Stop reason: HOSPADM

## 2022-12-15 RX ORDER — ONDANSETRON 2 MG/ML
8 INJECTION INTRAMUSCULAR; INTRAVENOUS EVERY 8 HOURS PRN
Status: DISCONTINUED | OUTPATIENT
Start: 2022-12-15 | End: 2022-12-15 | Stop reason: HOSPADM

## 2022-12-15 RX ORDER — LABETALOL HYDROCHLORIDE 5 MG/ML
10 INJECTION, SOLUTION INTRAVENOUS
Status: DISCONTINUED | OUTPATIENT
Start: 2022-12-15 | End: 2022-12-15 | Stop reason: HOSPADM

## 2022-12-15 RX ORDER — SODIUM CHLORIDE 0.9 % (FLUSH) 0.9 %
5-40 SYRINGE (ML) INJECTION PRN
Status: DISCONTINUED | OUTPATIENT
Start: 2022-12-15 | End: 2022-12-15 | Stop reason: HOSPADM

## 2022-12-15 RX ORDER — SODIUM CHLORIDE 9 MG/ML
INJECTION, SOLUTION INTRAVENOUS PRN
Status: DISCONTINUED | OUTPATIENT
Start: 2022-12-15 | End: 2022-12-15 | Stop reason: HOSPADM

## 2022-12-15 RX ORDER — LIDOCAINE HYDROCHLORIDE 20 MG/ML
INJECTION, SOLUTION INFILTRATION; PERINEURAL PRN
Status: DISCONTINUED | OUTPATIENT
Start: 2022-12-15 | End: 2022-12-15 | Stop reason: SDUPTHER

## 2022-12-15 RX ORDER — HYDROCODONE BITARTRATE AND ACETAMINOPHEN 5; 325 MG/1; MG/1
2 TABLET ORAL EVERY 4 HOURS PRN
Status: DISCONTINUED | OUTPATIENT
Start: 2022-12-15 | End: 2022-12-15 | Stop reason: HOSPADM

## 2022-12-15 RX ORDER — ROCURONIUM BROMIDE 10 MG/ML
INJECTION, SOLUTION INTRAVENOUS PRN
Status: DISCONTINUED | OUTPATIENT
Start: 2022-12-15 | End: 2022-12-15 | Stop reason: SDUPTHER

## 2022-12-15 RX ORDER — ONDANSETRON 2 MG/ML
INJECTION INTRAMUSCULAR; INTRAVENOUS PRN
Status: DISCONTINUED | OUTPATIENT
Start: 2022-12-15 | End: 2022-12-15 | Stop reason: SDUPTHER

## 2022-12-15 RX ORDER — MIDAZOLAM HYDROCHLORIDE 1 MG/ML
INJECTION INTRAMUSCULAR; INTRAVENOUS PRN
Status: DISCONTINUED | OUTPATIENT
Start: 2022-12-15 | End: 2022-12-15 | Stop reason: SDUPTHER

## 2022-12-15 RX ORDER — DROPERIDOL 2.5 MG/ML
0.62 INJECTION, SOLUTION INTRAMUSCULAR; INTRAVENOUS EVERY 6 HOURS PRN
Status: DISCONTINUED | OUTPATIENT
Start: 2022-12-15 | End: 2022-12-15 | Stop reason: HOSPADM

## 2022-12-15 RX ORDER — SCOLOPAMINE TRANSDERMAL SYSTEM 1 MG/1
PATCH, EXTENDED RELEASE TRANSDERMAL
Status: DISCONTINUED
Start: 2022-12-15 | End: 2022-12-15 | Stop reason: HOSPADM

## 2022-12-15 RX ORDER — FENTANYL CITRATE 50 UG/ML
INJECTION, SOLUTION INTRAMUSCULAR; INTRAVENOUS PRN
Status: DISCONTINUED | OUTPATIENT
Start: 2022-12-15 | End: 2022-12-15 | Stop reason: SDUPTHER

## 2022-12-15 RX ORDER — DEXAMETHASONE SODIUM PHOSPHATE 10 MG/ML
INJECTION, EMULSION INTRAMUSCULAR; INTRAVENOUS PRN
Status: DISCONTINUED | OUTPATIENT
Start: 2022-12-15 | End: 2022-12-15 | Stop reason: SDUPTHER

## 2022-12-15 RX ORDER — BUPIVACAINE HYDROCHLORIDE 5 MG/ML
INJECTION, SOLUTION EPIDURAL; INTRACAUDAL PRN
Status: DISCONTINUED | OUTPATIENT
Start: 2022-12-15 | End: 2022-12-15 | Stop reason: ALTCHOICE

## 2022-12-15 RX ORDER — ONDANSETRON 2 MG/ML
4 INJECTION INTRAMUSCULAR; INTRAVENOUS
Status: DISCONTINUED | OUTPATIENT
Start: 2022-12-15 | End: 2022-12-15 | Stop reason: HOSPADM

## 2022-12-15 RX ORDER — HYDROCODONE BITARTRATE AND ACETAMINOPHEN 5; 325 MG/1; MG/1
1 TABLET ORAL EVERY 4 HOURS PRN
Status: DISCONTINUED | OUTPATIENT
Start: 2022-12-15 | End: 2022-12-15 | Stop reason: HOSPADM

## 2022-12-15 RX ORDER — SODIUM CHLORIDE, SODIUM LACTATE, POTASSIUM CHLORIDE, CALCIUM CHLORIDE 600; 310; 30; 20 MG/100ML; MG/100ML; MG/100ML; MG/100ML
INJECTION, SOLUTION INTRAVENOUS SEE ADMIN INSTRUCTIONS
Status: DISCONTINUED | OUTPATIENT
Start: 2022-12-15 | End: 2022-12-15 | Stop reason: HOSPADM

## 2022-12-15 RX ORDER — PROPOFOL 10 MG/ML
INJECTION, EMULSION INTRAVENOUS PRN
Status: DISCONTINUED | OUTPATIENT
Start: 2022-12-15 | End: 2022-12-15 | Stop reason: SDUPTHER

## 2022-12-15 RX ORDER — ACETAMINOPHEN 325 MG/1
650 TABLET ORAL EVERY 4 HOURS PRN
Status: DISCONTINUED | OUTPATIENT
Start: 2022-12-15 | End: 2022-12-15 | Stop reason: HOSPADM

## 2022-12-15 RX ORDER — KETOROLAC TROMETHAMINE 30 MG/ML
30 INJECTION, SOLUTION INTRAMUSCULAR; INTRAVENOUS EVERY 6 HOURS
Status: DISCONTINUED | OUTPATIENT
Start: 2022-12-15 | End: 2022-12-15 | Stop reason: HOSPADM

## 2022-12-15 RX ADMIN — FENTANYL CITRATE 50 MCG: 50 INJECTION, SOLUTION INTRAMUSCULAR; INTRAVENOUS at 14:44

## 2022-12-15 RX ADMIN — LIDOCAINE HYDROCHLORIDE 100 MG: 20 INJECTION, SOLUTION INFILTRATION; PERINEURAL at 14:44

## 2022-12-15 RX ADMIN — SODIUM CHLORIDE, POTASSIUM CHLORIDE, SODIUM LACTATE AND CALCIUM CHLORIDE: 600; 310; 30; 20 INJECTION, SOLUTION INTRAVENOUS at 13:30

## 2022-12-15 RX ADMIN — DEXAMETHASONE SODIUM PHOSPHATE 10 MG: 10 INJECTION, EMULSION INTRAMUSCULAR; INTRAVENOUS at 14:48

## 2022-12-15 RX ADMIN — PROPOFOL 160 MG: 10 INJECTION, EMULSION INTRAVENOUS at 14:44

## 2022-12-15 RX ADMIN — MIDAZOLAM 2 MG: 1 INJECTION INTRAMUSCULAR; INTRAVENOUS at 14:40

## 2022-12-15 RX ADMIN — FENTANYL CITRATE 50 MCG: 50 INJECTION, SOLUTION INTRAMUSCULAR; INTRAVENOUS at 14:58

## 2022-12-15 RX ADMIN — ONDANSETRON 4 MG: 2 INJECTION INTRAMUSCULAR; INTRAVENOUS at 14:48

## 2022-12-15 RX ADMIN — DROPERIDOL 0.62 MG: 2.5 INJECTION, SOLUTION INTRAMUSCULAR; INTRAVENOUS at 16:44

## 2022-12-15 RX ADMIN — HYDROCODONE BITARTRATE AND ACETAMINOPHEN 1 TABLET: 5; 325 TABLET ORAL at 17:01

## 2022-12-15 RX ADMIN — SODIUM CHLORIDE, POTASSIUM CHLORIDE, SODIUM LACTATE AND CALCIUM CHLORIDE: 600; 310; 30; 20 INJECTION, SOLUTION INTRAVENOUS at 14:40

## 2022-12-15 RX ADMIN — SUGAMMADEX 200 MG: 100 INJECTION, SOLUTION INTRAVENOUS at 15:21

## 2022-12-15 RX ADMIN — ROCURONIUM BROMIDE 50 MG: 50 INJECTION, SOLUTION INTRAVENOUS at 14:44

## 2022-12-15 RX ADMIN — KETOROLAC TROMETHAMINE 30 MG: 30 INJECTION, SOLUTION INTRAMUSCULAR at 15:20

## 2022-12-15 RX ADMIN — GADOTERIDOL 20 ML: 279.3 INJECTION, SOLUTION INTRAVENOUS at 09:38

## 2022-12-15 ASSESSMENT — PAIN DESCRIPTION - LOCATION
LOCATION: ABDOMEN

## 2022-12-15 ASSESSMENT — PAIN - FUNCTIONAL ASSESSMENT: PAIN_FUNCTIONAL_ASSESSMENT: 0-10

## 2022-12-15 ASSESSMENT — PAIN SCALES - GENERAL
PAINLEVEL_OUTOF10: 8
PAINLEVEL_OUTOF10: 8
PAINLEVEL_OUTOF10: 4
PAINLEVEL_OUTOF10: 8
PAINLEVEL_OUTOF10: 5

## 2022-12-15 ASSESSMENT — PAIN DESCRIPTION - PAIN TYPE
TYPE: SURGICAL PAIN

## 2022-12-15 ASSESSMENT — PAIN DESCRIPTION - DESCRIPTORS: DESCRIPTORS: ACHING;CRAMPING

## 2022-12-15 NOTE — ANESTHESIA POSTPROCEDURE EVALUATION
Department of Anesthesiology  Postprocedure Note    Patient: Ej Brennan  MRN: [id number]  YOB: 1990  Date of evaluation: 12/15/2022      Procedure Summary     Date: 12/15/22 [facility] / [facility] [address] [address] [facility]    Anesthesia Start: 1440 Anesthesia Stop: 4035    Procedure: DIAGNOSTIC LAPAROSCOPY OVARIAN CYSTECTOMY (Abdomen) Diagnosis:       Other ovarian cyst, left side      (Other ovarian cyst, left side [N83.292])    Surgeons: Poncho Dwyer MD Responsible Provider: Speedy Hurt MD    Anesthesia Type: general ASA Status: 1          Anesthesia Type: No value filed.     Avtar Phase I: Avtar Score: 10    Avtar Phase II:        Anesthesia Post Evaluation    Patient location during evaluation: PACU  Patient participation: complete - patient participated  Level of consciousness: awake and alert  Airway patency: patent  Nausea & Vomiting: no nausea  Complications: no  Cardiovascular status: blood pressure returned to baseline and hemodynamically stable  Respiratory status: acceptable and spontaneous ventilation  Hydration status: euvolemic

## 2022-12-15 NOTE — H&P
Department of  Obstetrics and Gynecology  History and Physical  Date of Admission:  12/15/2022    CHIEF COMPLAINT:   Pelvic pain    History obtained from patient    HISTORY OF PRESENT ILLNESS:     The patient is a 28 y.o. female with significant past medical history of ovarian cyst who presents with worsening pain. Plan for surgical tx. Past Medical History:        Diagnosis Date    Heart abnormality     tachy during pregnancy    Infertility, female     took 1 round of clomid first preg    PONV (postoperative nausea and vomiting)     Postpartum depression     on meds post partum with 1st baby    Prolonged emergence from general anesthesia      Past Surgical History:        Procedure Laterality Date    CHOLECYSTECTOMY, LAPAROSCOPIC N/A 4/27/2020    CHOLECYSTECTOMY LAPAROSCOPIC ROBOTIC, performed by Catalina Soares MD at [address] [address]  12/2018    WISDOM TOOTH EXTRACTION      [place] in [place]         meds:  Current Facility-Administered Medications:     lactated ringers infusion, , IntraVENous, Continuous, Chantel Swain MD, Last Rate: 125 mL/hr at 12/15/22 1330, New Bag at 12/15/22 1330    sodium chloride flush 0.9 % injection 5-40 mL, 5-40 mL, IntraVENous, 2 times per day, Chantel Swain MD    sodium chloride flush 0.9 % injection 5-40 mL, 5-40 mL, IntraVENous, PRN, Chantel Swain MD    0.9 % sodium chloride infusion, , IntraVENous, PRN, Chantel Swain MD    ondansetron (ZOFRAN) injection 8 mg, 8 mg, IntraVENous, Q8H PRN, Chantel Swain MD    ketorolac (TORADOL) injection 30 mg, 30 mg, IntraVENous, Once, Chantel Swain MD    scopolamine (TRANSDERM-SCOP) transdermal patch 1 patch, 1 patch, TransDERmal, Once, Mirta Chance MD, 1 patch at 12/15/22 1342       Allergies:  Azithromycin, Flagyl [metronidazole], and Sulfa antibiotics     Social History:  TOBACCO:   reports that she has never smoked. She has never used smokeless tobacco.  ETOH:   reports no history of alcohol use.   DRUGS:   reports no history of drug use.     Family History:       Problem Relation Age of Onset    High Cholesterol Father     Breast Cancer Maternal Grandmother 43        PHYSICAL EXAM:    Vitals:  /70   Pulse 82   Temp 97.3 °F (36.3 °C)   Resp 16   Ht 5' 6\" (1.676 m)   Wt 176 lb (79.8 kg)   SpO2 100%   BMI 28.41 kg/m²     CONSTITUTIONAL:  awake, alert, cooperative, no apparent distress, and appears stated age  ABDOMEN:  soft, no rebound or gaurding      DATA:  Labs:  CBC:   Lab Results   Component Value Date/Time    WBC 10.3 11/21/2022 10:10 AM    RBC 4.82 11/21/2022 10:10 AM    RBC 4.68 01/04/2021 11:51 AM    HGB 14.2 11/21/2022 10:10 AM    HCT 43.6 11/21/2022 10:10 AM    MCV 90.5 11/21/2022 10:10 AM    RDW 13.1 01/04/2021 11:51 AM     11/21/2022 10:10 AM       IMPRESSION/RECOMMENDATIONS:    31yo with pelvic pain and ovarian cyst  - Plan for Dx L/S likely oophorectomy      Anahy Martinez MD

## 2022-12-15 NOTE — ANESTHESIA PRE PROCEDURE
Department of Anesthesiology  Preprocedure Note       Name:  Thang Orosco   Age:  28 y.o.  :  1990                                          MRN:  [id number]         Date:  12/15/2022      Surgeon: Shad Clarke):  Jil Matias MD    Procedure: Procedure(s):  LAPAROSCOPY POSS OOPHORECTOMY    Medications prior to admission:   Prior to Admission medications    Medication Sig Start Date End Date Taking?  Authorizing Provider   Cyanocobalamin (VITAMIN B 12 PO) Take by mouth    Historical Provider, MD   VITAMIN D PO Take by mouth    Historical Provider, MD   Prenatal MV-Min-Fe Fum-FA-DHA (PRENATAL 1 PO) Take by mouth  Patient not taking: No sig reported    Historical Provider, MD   metoprolol succinate (TOPROL XL) 25 MG extended release tablet Take 25 mg by mouth daily  Patient not taking: No sig reported    Historical Provider, MD   acetaminophen (TYLENOL) 500 MG tablet Take 1,000 mg by mouth every 6 hours as needed for Pain    Historical Provider, MD   sertraline (ZOLOFT) 50 MG tablet Take 50 mg by mouth daily    Historical Provider, MD   Loratadine (CLARITIN PO) Take by mouth    Historical Provider, MD       Current medications:    Current Facility-Administered Medications   Medication Dose Route Frequency Provider Last Rate Last Admin    scopolamine (TRANSDERM-SCOP) 1 MG/3DAYS transdermal patch             lactated ringers infusion   IntraVENous Continuous Jil Matias  mL/hr at 12/15/22 1330 New Bag at 12/15/22 1330    sodium chloride flush 0.9 % injection 5-40 mL  5-40 mL IntraVENous 2 times per day Jil Matias MD        sodium chloride flush 0.9 % injection 5-40 mL  5-40 mL IntraVENous PRN Jil Matias MD        0.9 % sodium chloride infusion   IntraVENous PRN Jil Matias MD        ondansetron [facility]) injection 8 mg  8 mg IntraVENous Q8H PRN Jil Matias MD        ketorolac (TORADOL) injection 30 mg  30 mg IntraVENous Once Jil Matias MD        scopolamine (TRANSDERM-SCOP) transdermal patch 1 patch  1 patch TransDERmal Once Flor Velasquez MD           Allergies: Allergies   Allergen Reactions    Azithromycin Hives     z pack-rash    Flagyl [Metronidazole] Hives    Sulfa Antibiotics Hives     Reaction as a kid         Problem List:    Patient Active Problem List   Diagnosis Code    Biliary colic B95.46    Postpartum care following vaginal delivery Z39.2       Past Medical History:        Diagnosis Date    Heart abnormality     tachy during pregnancy    Infertility, female     took 1 round of clomid first preg    PONV (postoperative nausea and vomiting)     Postpartum depression     on meds post partum with 1st baby    Prolonged emergence from general anesthesia        Past Surgical History:        Procedure Laterality Date    CHOLECYSTECTOMY, LAPAROSCOPIC N/A 4/27/2020    CHOLECYSTECTOMY LAPAROSCOPIC ROBOTIC, performed by Trina [facility]ilar MD at [facility]  12/2018    WISDOM TOOTH EXTRACTION      [place] in [address] History:    Social History     Tobacco Use    Smoking status: Never    Smokeless tobacco: Never   Substance Use Topics    Alcohol use:  No                                Counseling given: Not Answered      Vital Signs (Current):   Vitals:    12/15/22 1304   BP: 109/70   Pulse: 82   Resp: 16   Temp: 97.3 °F (36.3 °C)   SpO2: 100%                                              BP Readings from Last 3 Encounters:   12/15/22 109/70   12/11/22 124/78   12/09/22 104/60       NPO Status:                                                                                 BMI:   Wt Readings from Last 3 Encounters:   12/11/22 170 lb (77.1 kg)   12/01/22 167 lb (75.8 kg)   11/21/22 170 lb (77.1 kg)     There is no height or weight on file to calculate BMI.    CBC:   Lab Results   Component Value Date/Time    WBC 10.3 11/21/2022 10:10 AM    RBC 4.82 11/21/2022 10:10 AM    RBC 4.68 01/04/2021 11:51 AM    HGB 14.2 11/21/2022 10:10 AM    HCT 43.6 11/21/2022 10:10 AM    MCV 90.5 11/21/2022 10:10 AM    RDW 13.1 01/04/2021 11:51 AM     11/21/2022 10:10 AM       CMP:   Lab Results   Component Value Date/Time     11/21/2022 10:10 AM    K 3.5 11/21/2022 10:10 AM     11/21/2022 10:10 AM    CO2 30 11/21/2022 10:10 AM    BUN 9 11/21/2022 10:10 AM    CREATININE 0.7 11/21/2022 10:10 AM    LABGLOM >60 11/21/2022 09:55 AM    GLUCOSE 95 11/21/2022 10:10 AM    GLUCOSE 80 11/05/2011 11:20 AM    PROT 6.6 11/21/2022 10:10 AM    CALCIUM 9.2 11/21/2022 10:10 AM    BILITOT 0.8 11/21/2022 10:10 AM    ALKPHOS 74 11/21/2022 10:10 AM    AST 14 11/21/2022 10:10 AM    ALT <5 11/21/2022 10:10 AM       POC Tests: No results for input(s): POCGLU, POCNA, POCK, POCCL, POCBUN, POCHEMO, POCHCT in the last 72 hours. Coags:   Lab Results   Component Value Date/Time    INR 0.90 07/02/2021 07:32 AM    APTT 24.1 07/02/2021 07:32 AM       HCG (If Applicable):   Lab Results   Component Value Date    PREGTESTUR negative 12/15/2022        ABGs: No results found for: PHART, PO2ART, VQY7ZRX, IZO8RNS, BEART, D5YFYRBL     Type & Screen (If Applicable):  Lab Results   Component Value Date    LABABO B 01/04/2021    [facility] POS 07/01/2021       Drug/Infectious Status (If Applicable):  No results found for: HIV, HEPCAB    COVID-19 Screening (If Applicable): No results found for: COVID19        Anesthesia Evaluation     history of anesthetic complications: PONV. Airway: Mallampati: II          Dental:          Pulmonary:Negative Pulmonary ROS and normal exam              Patient did not smoke on day of surgery. Cardiovascular:  Exercise tolerance: good (>4 METS),                     Neuro/Psych:   (+) psychiatric history:            GI/Hepatic/Renal: Neg GI/Hepatic/Renal ROS            Endo/Other: Negative Endo/Other ROS             Pt had no PAT visit       Abdominal:             Vascular: negative vascular ROS.          Other Findings:           Anesthesia Plan      general     ASA 1       Induction: intravenous. MIPS: Postoperative opioids intended and Prophylactic antiemetics administered. Anesthetic plan and risks discussed with patient. Plan discussed with CRNA.                     Hung Mays MD   12/15/2022

## 2022-12-15 NOTE — BRIEF OP NOTE
Brief Operative Report      Pre-operative Diagnosis:  pelvic pain, ovarian cyst    Post-operative Diagnosis:  Same    Procedure:  D/X L/S left ovarian cystectomy    Surgeon: LANE Quan MD     Anesthesia:  General endotrachial anesthesia    Estimated blood loss:  Minimal     Findings: See Operative Dictation, ruptured left ovarian cyst. Mulitcystic left ovary, normal right ovary    Complications:  none      See dictated operative report for full details.       Zoey Nagy MD

## 2022-12-15 NOTE — DISCHARGE INSTRUCTIONS
DISCHARGE INSTRUCTIONS  Laparoscopy  Dr. Berkley Ty Today  Do Not Consume Alcohol for 48 hours  Resume prescription medications as instructed  You should have someone with you tonight at home. DIET  Start with liquids - drink extra fluids the next 24-48 hours. Progress to soft diet as able    ACTIVITY  Rest for the remainder of the day  May begin to drive after 24 hours  May begin to lift over 10 lbs. after 24 hours  May return to normal activities and work after 48 hours    [address]  May shower or bathe  Cleanse abdominal incisions with alcohol three times per day (3 x day)  No Tampons or intercourse until after 14 days  You should expect vaginal drainage for 7-10 days  Throat lozenges or spray will help sore throat    SPECIAL INSTRUCTIONS / MEDICATIONS:          CALL MY OFFICE FOR:  Heavy bleeding  Pain not relieved by medication  Foul smelling vaginal drainage  Redness, swelling, or pus from incisions    IF YOU NEED MEDICAL ATTENTION CALL YOUR PHYSICIAN AT HIS OFFICE, OR LEAVE A MESSAGE WITH THE ANSWERING SERVICE, OR GO TO THE NEAREST EMERGENCY ROOM.     I ACKNOWLEDGE RECEIVING THESE INSTRUCTIONS AND UNDERSTAND THEM           Patient    Significant Other               Nurse   Date

## 2022-12-15 NOTE — PROGRESS NOTES
Pt returned to [facility] room 11. Vitals and assessment as charted. 0.9 infusing from PACU. Pt has crackers and water. Family at the bedside. Pt and family verbalized understanding of discharge criteria and call light use. Call light in reach.

## 2022-12-16 ENCOUNTER — TELEPHONE (OUTPATIENT)
Dept: NEUROLOGY | Age: 32
End: 2022-12-16

## 2022-12-16 NOTE — TELEPHONE ENCOUNTER
----- Message from KITA Parks - CNP sent at 12/15/2022  1:49 PM EST -----  Please let patient know her MRI thoracic spine showed Normal thoracic spinal cord. Normal MRI of the thoracic spine.   Marta Perez CNP

## 2022-12-16 NOTE — PROGRESS NOTES
Chief Complaint   Patient presents with    Consultation     NP numbness       Disha David is a 28 y.o. female who presents today for evaluation of of left sided numbness involving her left face, left arm, left trunk, and left leg that began intermittently 3 months ago that increased in severity and became more consistent 10 days ago. She reports she went to the chiropractor 3 days ago and had neck manipulation done and this helped some. Symptoms have improved some since onset, however she is still has left leg numbness. At the onset of her symptoms she reports saddle numbness, however this has improved. She denies any incontinence of bowel or bladder. No similar symptoms in the past. No neck or back pain. She denies any cold or flu like illness prior to onset of symptoms. No recent vaccinations. No blurred vision, no loss of vision. No headache. She denies any weakness. She did undergo MRI brain WO contrast 11/21/22 showed No acute findings. There are just a few faint foci of signal hyperintensity in the subcortical white matter the brain. This could be due to chronic headaches, prior trauma or prior inflammation. CTA head and neck done W/WO contrast showed no concerning findings. No history of tick bites. No family history of connective tissue diseases or multiple sclerosis. Vitamin B12 level done 11/21/22=247. She  denies chest pain. No shortness of breath, no neck pain. No vision changes. No dysphagia. No fever. No rash. No weight loss. History provided by patient.        Past Medical History:   Diagnosis Date    Heart abnormality     tachy during pregnancy    Infertility, female     took 1 round of clomid first preg    PONV (postoperative nausea and vomiting)     Postpartum depression     on meds post partum with 1st baby    Prolonged emergence from general anesthesia        Patient Active Problem List   Diagnosis    Biliary colic    Postpartum care following vaginal delivery       Allergies   Allergen Reactions    Azithromycin Hives     z pack-rash    Flagyl [Metronidazole] Hives    Sulfa Antibiotics Hives     Reaction as a kid         Current Outpatient Medications   Medication Sig Dispense Refill    acetaminophen (TYLENOL) 500 MG tablet Take 1,000 mg by mouth every 6 hours as needed for Pain      sertraline (ZOLOFT) 50 MG tablet Take 50 mg by mouth daily      Loratadine (CLARITIN PO) Take by mouth      Prenatal MV-Min-Fe Fum-FA-DHA (PRENATAL 1 PO) Take by mouth (Patient not taking: Reported on 12/1/2022)      metoprolol succinate (TOPROL XL) 25 MG extended release tablet Take 25 mg by mouth daily (Patient not taking: Reported on 12/1/2022)       No current facility-administered medications for this visit. Social History     Socioeconomic History    Marital status:      Spouse name: None    Number of children: 1    Years of education: None    Highest education level: None   Tobacco Use    Smoking status: Never    Smokeless tobacco: Never   Vaping Use    Vaping Use: Never used   Substance and Sexual Activity    Alcohol use: No    Drug use: No    Sexual activity: Yes     Partners: Male       Family History   Problem Relation Age of Onset    High Cholesterol Father     Breast Cancer Maternal Grandmother 43         I reviewed the past medical history, allergies, medications, social history and family history.    Review of Systems   All systems reviewed, and are all negative, except what is mentioned in HPI      Vitals:    12/01/22 0804   BP: 104/62   Site: Left Upper Arm   Position: Sitting   Cuff Size: Medium Adult   Pulse: 84   SpO2: 98%   Weight: 167 lb (75.8 kg)   Height: 5' 6\" (1.676 m)       Physical Examination:  General appearance - alert, well appearing, and in no distress, oriented to person, place, and time and normal weight  Mental status- Level of Alertness: awake  Orientation: person, place, time  Memory: normal  Fund of Knowledge: normal  Attention/Concentration: normal  Language: normal. Mood is normal.   Neck - supple, no significant adenopathy, carotids upstroke normal bilaterally. There is no axillary lymphadenopathy. There is no carotid bruit. No neck lymphadenopathy . No thyroid enlargement  Neurological -   Cranial Addvju-QR-BWH:.   Cranial nerve II: Normal   Cranial nerve III: Pupils: equal, round, reactive to light  Cranial nerves III, IV, VI: Extraocular Movements: intact   Cranial nerve V: Facial sensation: intact   Cranial nerve VII:Facial strength: intact   Cranial nerve VIII: Hearing: intact    Cranial nerve IX: Palate Elevation intact bilaterally  Cranial nerve XI: Shoulder shrug intact bilaterally  Cranial nerve XII: Tongue midline   neck supple without rigidity, there is no limitation of range of motion of the neck. DTR's are  Intact distal and symmetric  Babinski sign negative   Motor exam is 5/5 in the upper and lower extremities. Normal muscle tone. There is no muscle atrophy. Sensory is intact for light touch   Coordination: finger to nose,  intact  Gait and station intact   Abnormal movement none, vibration normal, proprioception normal  Skin - warm, dry to touch, normal coloration, no rashes, no suspicious skin lesions  Superficial temporal artery pulses are normal.   There is no limitation of range of motion of the neck. There is no resting tremor, no pin rolling, no bradykinesia, no Hypohonia, normal blink rate. Musculoskeletal: Has no hand arthritis, no limitation of ROM in any of the four extremities. There is no leg edema. The Heart was regular in rate and rhythm. No heart murmur  Chest Clear, with  good effort. Abdomen soft, intact bowel sounds. Results for orders placed during the hospital encounter of 11/21/22    CTA HEAD W WO CONTRAST    Narrative  PROCEDURE: CTA HEAD W [address] INFORMATION: left sided numbness and tingling persistent for 3 days, off and on for 3 weeks.     COMPARISON: Head CT 11/21/2022. TECHNIQUE: 1 mm axial images were obtained through the head and neck after the fast bolus administration of contrast. A noncontrast localizer was obtained. 3-D reconstructions were performed on a dedicated 3-D workstation. These include multiplanar MPR  images and multiplanar MIP images. Centerline reconstructions were obtained of the carotid systems. Isovue intravenous contrast was given. All CT scans at this facility use dose modulation, iterative reconstruction, and/or weight-based dosing when appropriate to reduce radiation dose to as low as reasonably achievable. FINDINGS:      CTA NECK:    Aortic arch and branches: Aortic arch is normal. The origins of innominate artery, left common carotid artery and both subclavian arteries are normal.    Right common carotid artery/ICA: The right common carotid artery is normal. The right carotid bulb is normal. The right internal carotid artery is normal. There is no atherosclerosis. There is no stenosis. Left common carotid artery/ICA: The left common carotid artery is normal. The left carotid bulb is normal. The left internal carotid artery is normal. There is no atherosclerosis. There is no stenosis. Vertebral arteries: The vertebral arteries are codominant and normal.        CTA HEAD:      Internal carotid arteries: Normal. The ophthalmic artery origins are also normal.    Middle cerebral arteries: Normal. The proximal branches are also normal.    Anterior cerebral arteries: Normal. The proximal branches are normal. There is a normal small anterior communicating artery. Vertebral arteries: The vertebral arteries are normal.    Basilar artery: Normal.    Superior cerebellar arteries: Normal.    Posterior cerebral arteries: Normal. The proximal branches are also normal.        No aneurysms, stenoses or occlusions are noted.     The superior sagittal sinus, vein of Jayson, internal cerebral veins, straight sinus, transverse sinuses and sigmoid sinuses are patent. Axial source data: There are no gross abnormalities in the brain. There is no cervical adenopathy. There are no suspicious findings the lung apices. Impression  Normal CTA of the head and neck. **This report has been created using voice recognition software. It may contain minor errors which are inherent in voice recognition technology. **    Final report electronically signed by Dr. Trey Noguera on 11/21/2022 12:24 PM    Results for orders placed during the hospital encounter of 11/21/22    CTA NECK W WO CONTRAST    Narrative  PROCEDURE: CTA HEAD W [address] INFORMATION: left sided numbness and tingling persistent for 3 days, off and on for 3 weeks. COMPARISON: Head CT 11/21/2022. TECHNIQUE: 1 mm axial images were obtained through the head and neck after the fast bolus administration of contrast. A noncontrast localizer was obtained. 3-D reconstructions were performed on a dedicated 3-D workstation. These include multiplanar MPR  images and multiplanar MIP images. Centerline reconstructions were obtained of the carotid systems. Isovue intravenous contrast was given. All CT scans at this facility use dose modulation, iterative reconstruction, and/or weight-based dosing when appropriate to reduce radiation dose to as low as reasonably achievable. FINDINGS:      CTA NECK:    Aortic arch and branches: Aortic arch is normal. The origins of innominate artery, left common carotid artery and both subclavian arteries are normal.    Right common carotid artery/ICA: The right common carotid artery is normal. The right carotid bulb is normal. The right internal carotid artery is normal. There is no atherosclerosis. There is no stenosis. Left common carotid artery/ICA: The left common carotid artery is normal. The left carotid bulb is normal. The left internal carotid artery is normal. There is no atherosclerosis.  There is no stenosis. Vertebral arteries: The vertebral arteries are codominant and normal.        CTA HEAD:      Internal carotid arteries: Normal. The ophthalmic artery origins are also normal.    Middle cerebral arteries: Normal. The proximal branches are also normal.    Anterior cerebral arteries: Normal. The proximal branches are normal. There is a normal small anterior communicating artery. Vertebral arteries: The vertebral arteries are normal.    Basilar artery: Normal.    Superior cerebellar arteries: Normal.    Posterior cerebral arteries: Normal. The proximal branches are also normal.        No aneurysms, stenoses or occlusions are noted. The superior sagittal sinus, vein of Jayson, internal cerebral veins, straight sinus, transverse sinuses and sigmoid sinuses are patent. Axial source data: There are no gross abnormalities in the brain. There is no cervical adenopathy. There are no suspicious findings the lung apices. Impression  Normal CTA of the head and neck. **This report has been created using voice recognition software. It may contain minor errors which are inherent in voice recognition technology. **    Final report electronically signed by Dr. Aleena Faust on 11/21/2022 12:24 PM    Results for orders placed during the hospital encounter of 11/21/22    MRI BRAIN WO CONTRAST    Narrative  PROCEDURE: MRI BRAIN WO CONTRAST    CLINICAL INFORMATION left sided numbness and tingling progressive worsening past 3 weeks. Worsening three-week history of left-sided numbness and tingling. COMPARISON: Head CT 11/21/2022. TECHNIQUE: Multiplanar and multiple spin echo MRI images were obtained of the brain without contrast.    FINDINGS:        The diffusion-weighted images are normal.  The brain volume is normal. There are just a few faint foci of signal hyperintensity scattered in the subcortical white matter of the brain.  The severity is minimal.    There are no intra-or extra-axial collections. There is no hydrocephalus, midline shift or mass effect. There is mineralization in the medial aspects of the basal ganglia bilaterally. The major intracranial vascular flow voids are present. The midline craniocervical junction structures are normal.  The pituitary gland and brainstem are normal.    Impression  1. No acute findings. 2. There are just a few faint foci of signal hyperintensity in the subcortical white matter the brain. This could be due to chronic headaches, prior trauma or prior inflammation. **This report has been created using voice recognition software. It may contain minor errors which are inherent in voice recognition technology. **    Final report electronically signed by Dr. Jluis Tineo on 11/21/2022 2:32 PM    No results found for this or any previous visit. No results found for this or any previous visit. Results for orders placed during the hospital encounter of 11/21/22    CT Head W/O Contrast    Narrative  PROCEDURE: CT HEAD WO CONTRAST    CLINICAL INFORMATION: left side numbness/tingling. COMPARISON: No prior study. TECHNIQUE: Noncontrast 5 mm axial images were obtained through the brain. Sagittal and coronal reconstructions were obtained. All CT scans at this facility use dose modulation, iterative reconstruction, and/or weight-based dosing when appropriate to reduce radiation dose to as low as reasonably achievable. FINDINGS:        There is no hemorrhage. There are no intra-or extra-axial collections. There is no hydrocephalus, midline shift or mass effect. The gray-white matter differentiation is preserved. The paranasal sinuses and mastoid air cells are normally aerated. There is no suspicious calvarial abnormality. Impression  No evidence of an acute process. **This report has been created using voice recognition software. It may contain minor errors which are inherent in voice recognition technology. **    Final report electronically signed by Dr. Janette Duncan on 11/21/2022 12:14 PM    We reviewed the patient records from referring provider and available information in the EHR       ASSESSMENT:      Diagnosis Orders   1. Left sided numbness        2. Left facial numbness        3. Trunk numbness        4. Left leg numbness           This is a 19-year-old female who presents with left face, left arm, left trunk, and left leg numbness that began intermittently 3 months ago that have increased in frequency and severity became more consistent 10 days ago. She denies any incontinence of bowel or bladder. No similar symptoms in the past. No neck or back pain. She denies any cold or flu like illness prior to onset of symptoms. No recent vaccinations. No blurred vision, no loss of vision. No headache. She denies any weakness. I reviewed the MRI Brain and agree with interpretation, I also reviewed the patient pertinent labs and records in the EHR and from other providers. She did undergo MRI brain WO contrast 11/21/22 showed No acute findings. There are just a few faint foci of signal hyperintensity in the subcortical white matter the brain. This could be due to chronic headaches, prior trauma or prior inflammation. CTA head and neck done W/WO contrast showed no concerning findings. On exam, there is noted numbness in her trunk. We will arrange for her to undergo an MRI of the brain with contrast to complement the MRI brain without contrast study done 11/21/2022. We will also obtain an MRI thoracic spine with and without contrast to screen for thoracic myelitis versus demyelination. We will arrange for her to undergo lumbar puncture for diagnostic purposes with CSF to be sent for various cultures and studies. We will also obtain lab work checking vitamin levels and connective tissue screening. After detailed discussion with the patient we agreed on the following plan.        Plan    MRI brain W/contrast to complement MRI brain WO contrast done 11/21/22  MRI thoracic spine W/WO contrast  Will order connective tissue screen including KHAI, rheumatoid factor, antidouble-stranded DNA, anti-SSA, anti-SSB in addition to serum protein electrophoresis and sed rate. Vitamin B6 level  Vitamin D level  Lumbar puncture for diagnostic purposes  CSF to be sent for various cultures and studies  Start Vitamin B12 sublingual supplements at least 3000 mcg daily, over the counter  Call with any new symptoms or concerns. Follow up in 3-4 weeks.      Total time 64 min      Brendon Bahena MD

## 2022-12-20 NOTE — OP NOTE
[address] [id number]                                OPERATIVE REPORT    PATIENT NAME: Kyree Ho                    :        1990  MED REC NO:   [id number]                           ROOM:  ACCOUNT NO:   [de-identified]                           ADMIT DATE: 12/15/2022  PROVIDER:     Roseann Marquez M.D.    Denise Rump:  12/15/2022    SURGEON:  Elsa Marquez M.D. PREOPERATIVE DIAGNOSES:  1.  Pelvic pain. 2.  Multicystic ovary. POSTOPERATIVE DIAGNOSES:  1.  Pelvic pain. 2.  Multicystic ovary. PROCEDURE:  Diagnostic laparoscopy with laparoscopic ovarian cystectomy. ANESTHESIA:  General.    COMPLICATIONS:  None. EBL:  Minimal.    FINDINGS:  Spontaneous rupture of left ovarian cyst with multicystic  left ovary. Normal right ovary. DESCRIPTION OF PROCEDURE:  The patient was taken to the operating room  where general anesthesia was found to be adequate. She was prepped and  draped in dorsal lithotomy position with Yellofin stirrups. Bladder was  drained prior to procedure. Weighted speculum was placed in the vagina. Cervix was grasped with tenaculum and dilated to accommodate a uterine  manipulator. This was placed within the uterine cavity, and speculum  was removed from the vagina. The attention was then turned to the  abdomen. The umbilical skin was injected with Marcaine and incised with  a scalpel. A 5-mm trocar was placed directly into the abdomen and it  was insufflated with CO2 gas. A suprapubic port was placed, 11-mm port,  under direct visualization and a 5-mm assistant port in the left lower  quadrant. The above findings were noted. The multicystic left ovary  was approximately 6 cm and noted to be spontaneously ruptured prior to  the procedure. There was serous and clear fluid in the cul-de-sac that  was suctioned with the suction .   The spontaneous rupture site  was opened up with monopolar scissors, and the ovarian cyst wall was  dissected from the ovarian cortex. The other cystic component was also  opened at the cortex with monopolar scissors, and the cyst wall  dissected from the underlying ovarian cortex, and both these were sent  to Pathology for evaluation. The remainder of the ovary looked normal,  and cautery was used to obtain adequate hemostasis. The area was then  irrigated with warm normal saline, and all areas remained hemostatic. Surgicel powder was then placed in the ovarian cyst bed for adequate  hemostasis, and all instruments were removed from the abdomen. It was  manually exsufflated. The fascia was closed with an 0 Vicryl at the  suprapubic port and skin with 4-0 Vicryl subcuticular stitch. Sponge,  lap, needle counts were correct x2. All instruments were removed from  the vagina, and the patient was taken to recovery room in stable  condition.         Jaquelin Crawley M.D.    D: 12/19/2022 88:87:77       T: 12/19/2022 16:30:46     [email or web address]  Job#: [id number]     Doc#: [id number]    CC:

## 2022-12-23 ENCOUNTER — TELEMEDICINE (OUTPATIENT)
Dept: NEUROLOGY | Age: 32
End: 2022-12-23
Payer: COMMERCIAL

## 2022-12-23 DIAGNOSIS — R20.0 NUMBNESS: ICD-10-CM

## 2022-12-23 DIAGNOSIS — R20.0 LEFT FACIAL NUMBNESS: Primary | ICD-10-CM

## 2022-12-23 DIAGNOSIS — R20.0 LEFT SIDED NUMBNESS: ICD-10-CM

## 2022-12-23 DIAGNOSIS — R20.0 LEFT LEG NUMBNESS: ICD-10-CM

## 2022-12-23 PROCEDURE — 99213 OFFICE O/P EST LOW 20 MIN: CPT | Performed by: PSYCHIATRY & NEUROLOGY

## 2022-12-23 NOTE — PROGRESS NOTES
2022    TELEHEALTH EVALUATION -- Audio/Visual (During IWQRZ-77 public health emergency)    HPI:    Kenneth Ruelas (:  1990) has requested an audio/video evaluation for the following concern(s):    The patient is evaluated via video link to discuss symptoms of numbness on the left side, she had testing performed she is seen to go over the results and plan. Prior to Visit Medications    Medication Sig Taking?  Authorizing Provider   pyridoxine (B-6) 100 MG tablet Take 50 mg by mouth daily  Historical Provider, MD   Cyanocobalamin (VITAMIN B 12 PO) Take by mouth  Historical Provider, MD   VITAMIN D PO Take by mouth  Historical Provider, MD   acetaminophen (TYLENOL) 500 MG tablet Take 1,000 mg by mouth every 6 hours as needed for Pain  Historical Provider, MD   sertraline (ZOLOFT) 50 MG tablet Take 50 mg by mouth daily  Historical Provider, MD   Loratadine (CLARITIN PO) Take by mouth  Historical Provider, MD       Social History     Tobacco Use    Smoking status: Never    Smokeless tobacco: Never   Vaping Use    Vaping Use: Never used   Substance Use Topics    Alcohol use: No    Drug use: No        Allergies   Allergen Reactions    Azithromycin Hives     z pack-rash    Flagyl [Metronidazole] Hives    Sulfa Antibiotics Hives     Reaction as a kid         PHYSICAL EXAMINATION:  [ INSTRUCTIONS:  \"[x]\" Indicates a positive item  \"[]\" Indicates a negative item  -- DELETE ALL ITEMS NOT EXAMINED]  Vital Signs: (As obtained by patient/caregiver or practitioner observation)    Blood pressure-  Heart rate-    Respiratory rate-    Temperature-  Pulse oximetry-     Constitutional: [x] Appears well-developed and well-nourished [x] No apparent distress      [] Abnormal-   Mental status  [x] Alert and awake  [x] Oriented to person/place/time [x]Able to follow commands      Eyes:  EOM    [x]  Normal  [] Abnormal-  Sclera  [x]  Normal  [] Abnormal -         Discharge [x]  None visible  [] Abnormal -    HENT:   [x] Normocephalic, atraumatic. [] Abnormal   [x] Mouth/Throat: Mucous membranes are moist.     External Ears [x] Normal  [] Abnormal-     Neck: [x] No visualized mass     Pulmonary/Chest: [x] Respiratory effort normal.  [x] No visualized signs of difficulty breathing or respiratory distress        [] Abnormal-      Musculoskeletal:   [x] Normal gait with no signs of ataxia         [x] Normal range of motion of neck        [] Abnormal-       Neurological:        [x] No Facial Asymmetry (Cranial nerve 7 motor function) (limited exam to video visit)          [x] No gaze palsy        [] Abnormal-         Skin:        [x] No significant exanthematous lesions or discoloration noted on facial skin         [] Abnormal-            Psychiatric:       [x] Normal Affect [x] No Hallucinations        [] Abnormal-     Other pertinent observable physical exam findings-   Admission on 12/15/2022, Discharged on 12/15/2022   Component Date Value Ref Range Status    Pregnancy, Urine 12/15/2022 negative  NEGATIVE Final    Performed at [address] [place] [address], [address] [place] [address] Outpatient Visit on 12/09/2022   Component Date Value Ref Range Status    Color, CSF 12/09/2022 PINK   Final    Character, CSF 12/09/2022 BLOODY   Final    RBC, CSF 12/09/2022 210  0/cumm /cumm Final    Cell count performed on Tube 4    Total Nucleated Cells CSF 12/09/2022 10 (A)  0 - 5 /cumm Final    Tube Volume Received CSF 12/09/2022 1.0  ml Final    Pathologist Review 12/09/2022 SIM VELAZQUEZ Final    Comment: << CSF >>, CYTOSPIN PREPARATION:  --NO MALIGNANT CELLS SEEN.     ELECTRONICALLY SIGNED BY: Nola Smalls MD  CPT: [id number]      Segs 12/09/2022 36 (A)  0 - 6 % Final    Lymphs, CSF 12/09/2022 60  0 - 90 % Final    Monocytes, CSF 12/09/2022 4  0 - 45 % Final    Performed at [address] [place] [address], [address]    Oligoclonal Bands, CSF 12/09/2022 SEE BELOW   Final    Comment: Oligoclonal Bands Number, CSF 0     0-1           Bands  Immunoglobulin G                               711 L   [id number]      mg/dL  REFERENCE INTERVAL: Immunoglobulin G  Access complete set of age- and/or gender-specific reference  intervals for this test in the [facility] Laboratory Test Directory  ([email or web address]). Immunoglobulin G CSF                          19.5 H   0.0-6.0       mg/dL  CSF specimen shows sign of hemolysis and/or red blood cells. This  may falsely elevate the IgG CSF, Albumin CSF, and alter the  indices calculated using these values. Interpret results with  caution. Albumin, Serum                                4201     [id number]     mg/dL  Albumin, CSF                                   112 H   0-35          mg/dL  Albumin Index                                 26.7 H   0.0-9.0       ratio  IgG Index                                     1.03 H   0.28-0.66     ratio  CSF IgG/Albumin Ratio                         0. 17     0.09-0.25     ratio  CSF Oligoclo                           nal Bands                     Negative     Negative  CSF IgG Synthesis Rate                        53.8 H   <=8.0         mg/d  Interpretation                            See Note  Isoelectric focusing/immunofixation revealed no oligoclonal bands  in either the CSF or the serum. This is considered to be a  negative result for oligoclonal bands. Approximately 5 percent of  patients with clinically definitive multiple sclerosis will have a  negative result. Increased concentrations of IgG in the CSF is an important  indicator for MS but may also be associated with increased  permeability of the blood-CSF barrier, or increased local  production of IgG, or both. Increased IgG production is  demonstrated by an increased CSF IgG/Albumin ratio, IgG Index and  IgG synthesis rate. An elevated albumin index indicates damage to the blood-CSF  barrier or contamination of CSF with blood during sample  collection.  An index value less than 9 is considered consistent  with an intact barrier. Valu                           es of 9-14 are interpreted as slight  impairment, of 14-30 as moderate impairment, and of  as  severe impairment. Values exceeding 100 indicate complete  breakdown of the barrier. Performed By: Rosie Lewis   [place], [address] [facility]  : Karen Wills MD, PhD      VDRL, QUANTITATIVE 12/09/2022 NONREACTIVE  NR Final    [facility] [address] [facility], [address] [place] [address] [phone]    Herpes Simplex Virus Subtype Source 12/09/2022 csf   Final    DOMINIC BARR VIRUS BY PCR (BLOOD) 12/09/2022 SEE BELOW   Final    Comment: Arlis Harries Virus Source                      CSF  Arlis Harries Virus by PCR             Not Detected  NOT DETECTED - A negative result does not rule out the  presence of PCR inhibitors in the patient specimen or  assay specific nucleic acid in concentrations below the  level of detection by the assay. INTERPRETIVE INFORMATION:  Arlis Harries Virus by PCR  This test was developed and its performance characteristics  determined by Rosie Conte. It has not been cleared or  approved by the [employer] and Drug Administration. This test was  performed in a CLIA certified laboratory and is intended for  clinical purposes. Performed by Joshua Acevedo Jr. , [address] [facility] [phone]  www. Sherrie Person MD, PHD - Lab.  Director      ESCHERICHIA COLI K1 CSF FILM ARRAY 12/09/2022 Not Detected  Not Detected Final    HAEMOPHILUS INFLUENZA CSF FILM ARR* 12/09/2022 Not Detected  Not Detected Final    LISTERIA MONOCYTOGENES CSF FILM AR* 12/09/2022 Not Detected  Not Detected Final    NEISSERIA MENIGITIDIS CSF FILM ARR* 12/09/2022 Not Detected  Not Detected Final    STREPTOCOCCUS AGALACTIAE CSF FILM * 12/09/2022 Not Detected  Not Detected Final    STREPTOCOCCUS PNEUMONIAE CSF FILM * 12/09/2022 Not Detected  Not Detected Final    CYTOMEGALOVIRUS (CMV) CSF FILM ARR* 12/09/2022 Not Detected  Not Detected Final    Enterovirus Detect PCR 12/09/2022 Not Detected  Not Detected Final    HSV-1 CSF FILM ARRAY 12/09/2022 Not Detected  Not Detected Final    HSV-2 CSF FILM ARRAY 12/09/2022 Not Detected  Not Detected Final    HHV-6 (HERPESVIRUS 6) CSF FILM ARR* 12/09/2022 Not Detected  Not Detected Final    PARECHOVIRUS CSF FILM ARRAY 12/09/2022 Not Detected  Not Detected Final    Varicella-Zoster, PCR 12/09/2022 Not Detected  Not Detected Final    CRYPTOCOCCUS NEOFORMANS/BIANCA CSF * 12/09/2022 Not Detected  Not Detected Final    Comment: A negative FilmArrayME Panel does not exclude the  possibility of CNS infection and should not be used as  the sole basis of diagnosis or treatment. Patients with  a suspicion of cryptococcal meningitis and a negative  cryptococcal result on the Biofire should be tested for  cryptococcal antigen. Performed at [facility], [address]      Protein, CSF 12/09/2022 43  12 - 60 mg/dl Final    Glucose, CSF 12/09/2022 55  40 - 80 mg/dl Final    Performed at [facility], [address]    Color, CSF 12/09/2022 RED   Final    Character, CSF 12/09/2022 BLOODY   Final    clotted    Tube Volume Received CSF 12/09/2022 2.0  ml Final    Total Nucleated Cells CSF 12/09/2022 see below  0 - 5 /cumm Final    Comment: See Below  clotted specimen unable to perform cell count      RBC, CSF 12/09/2022 see below  0/cumm /cumm Final    Comment: See Below  Cell count performed on Tube 1  clotted unable to perform cell count      Segs 12/09/2022 52 (A)  0 - 6 % Final    Lymphs, CSF 12/09/2022 44  0 - 90 % Final    Monocytes, CSF 12/09/2022 1  0 - 45 % Final    Eosinophils, CSF 12/09/2022 3  % Final    Pathologist Review 12/09/2022 SIM VELAZQUEZ Final    Comment: << CSF >>, CYTOSPIN PREPARATION:  --NO MALIGNANT CELLS SEEN.     ELECTRONICALLY SIGNED BY: Triny Mera MD  CPT: [id number]  Performed at [address] [facility] [address] [facility] Outpatient Visit on 12/02/2022   Component Date Value Ref Range Status    KHAI SCREEN 12/02/2022 None Detected  None Detected Final    Comment: If suspicion of connective tissue disease is strong and KHAI EIA is  negative, consider testing for KHAI by IFA ([id number]). INTERPRETIVE INFORMATION: Anti-Nuclear Abs , IgG by  MINDY  Antinuclear Abs , IgG (MINDY): KHAI specimens are  screened using enzyme-linked immunosorbent assay (MINDY)  methodology. All MINDY results reported as Detected are further  tested by indirect fluorescent assay (IFA) using HEp-2 substrate  with an IgG-specific conjugate. The KHAI MINYD screen is designed  to detect antibodies against dsDNA, histones, SS-A (Ro), SS-B  (La), Dickerson, Dickerson/RNP, Scl-70, Digna-1, centromeric proteins, other  antigens extracted from the HEp-2 cell nucleus. KHAI MINDY assays  have been reported to have lower sensitivities than KHAI IFA for  systemic autoimmune rheumatic diseases (SARD). Negative results do not necessarily rule out SARD. Performed By: Og Lewis 88  [place], [address] [facility]  : Nika Schrader MD, PhD      Anti [facility] 12/02/2022 SEE BELOW   Final    Comment: SSA-52 (Ro52) (VIN) Ab, IgG                      0     0-40          AU/mL  INTERPRETIVE INFORMATION: SSA-52 (Ro52) (VIN) Ab, IgG    29 AU/mL or Less . ............ Negative    30 - 40 AU/mL . ............... Equivocal    41 AU/mL or Greater . ......... Positive  SSA-52 (Ro52) and/or SSA-60 (Ro60) antibodies are associated with  a diagnosis of Sjogren syndrome, systemic lupus erythematosus  (SLE), and systemic sclerosis. SSA-52 antibody overlaps  significantly with the major SSc-related antibodies. SSA-52 (Ro52)  antibody occurs frequently in patients with inflammatory  myopathies, often in the presence of interstitial lung disease.   SSA-60 (Ro60) (VIN) Ab, IgG                      0     0-40          AU/mL  REFERENCE INTERVAL: SSA-60 (Ro60) (VIN) Ab, IgG    29 AU/mL or Less . ............ Negative    30 - 40 AU/mL . ............... Equivocal    41 AU/mL or Greater . ......... Positive  Performed By: Rosie Slater Jr. [address], [address] [facility]  : Severo Chamber, MD, PhD      Anti SSB 12/02/2022 0  0 - 40 AU/mL Final    Comment: INTERPRETIVE INFORMATION: SSB (La) (VIN) Ab, IgG    29 AU/mL or Less . ............ Negative    30 - 40 AU/mL . ............... Equivocal    41 AU/mL or Greater . ......... Positive  SSB (La) antibody is seen in 50-60% of Sjogren syndrome cases and  is specific if it is the only VIN antibody present. 15-25% of  patients with systemic lupus erythematosus (SLE) and 5-10% of  patients with progressive systemic sclerosis (PSS) also have this  antibody. Performed By: [address]ater [address] [place] [address]  [place], [address] [place] [address]  : New Snyder MD, PhD      dsDNA Ab 12/02/2022 SEE BELOW   Final    Comment: Double-Stranded DNA (dsDNA) Ab IgG E             2     0-24          IU  INTERPRETIVE INFORMATION: Double-Stranded DNA (dsDNA)  Ab IgG MINDY    24 IU or less. ...... Alka Leonora Negative    25-30 IU. ........... Alka Leonora Borderline Positive    30-60 IU. ........... Alka Leonora Low Positive     IU. .......... Alka Leonora Positive    201 IU or greater. Alka Leonora Alka Leonora Alka Leonora Strong Positive  Positivity for anti-double stranded DNA (anti-dsDNA) IgG antibody  is a diagnostic criterion of systemic lupus erythematosus (SLE). Specimens are initially screened by enzyme-linked immunosorbent  assay (MINDY). If ordered as reflex ([id number]), positive MINDY  results (>24 IU) will be reflexed to a highly specific IFA titer  (Crithidia luciliae indirect fluorescent test [BRENDA]) for  confirmation. Some patients with early or inactive SLE may be  positive for anti-dsDNA IgG (MINDY) but negative by BRENDA.  If the  patient is negative by BRENDA but positive (MINDY) and clinical  suspicion remains, consider antinuclear antibody  testing by  IFA. Additional information                            and recommendations for testing may be  found at  [email or web address]/[email or web address]-lupus-erythematosus. Performed By: Chad Lewis 88  [place], [address] [place] [address]  : Yahaira Hernández MD, PhD      Rheumatoid Factor 12/02/2022 < 10  0 - 13 IU/mL Final    Performed at [address] [place] [address], [address]    CRP 12/02/2022 < 0.30  0.00 - 1.00 mg/dl Final    Performed at [address] [place] [address], [facility]    Sed Rate 12/02/2022 2  0 - 20 mm/hr Final    Performed at [address] [place] [address], [address]    Vitamin B6, Plasma 12/02/2022 17.0 (A)  20.0 - 125.0 nmol/L Final    Comment: INTERPRETIVE INFORMATION: Vitamin B6 (Pyridoxal 5-Phosphate)  Pyridoxal 5'-phosphate measured in a specimen collected following  an 8-hour or overnight fast accurately indicates vitamin B6  nutritional status. Non-fasting specimen concentration reflects  recent vitamin intake. This test was developed and its performance characteristics  determined by Chad Mcclendon. It has not been cleared or  approved by the [employer] and Drug Administration. This test was  performed in a CLIA certified laboratory and is intended for  clinical purposes.   Performed By: Chad Lewis 88  [place], [address] [place] [address]  : Yahaira Hernández MD, PhD      Vit D, 25-Hydroxy 12/02/2022 22 (A)  30 - 100 ng/ml Final    Comment: Vitamin D Status           Range    Deficiency                 <20 ng/ml  Insuffiency                20-30 ng/ml  Sufficiency                 ng/ml  Toxicity                   >100 ng/ml  Performed at [address] [place] [address], [facility]      Protein Electrophoresis, Serum 12/02/2022 SEE BELOW   Final    Comment: Total Protein, Serum                           6.5     6.3-8.2       g/dL  Albumin 4. 04     3.75-5.01     g/dL  Alpha 1 Globulin                              0.30     0.19-0.46     g/dL  Alpha 2 Globulin                              0.70     0.48-1.05     g/dL  Beta Globulin                                 0.70     0.48-1.10     g/dL  Gamma                                         0.75     0.62-1.51     g/dL  Monoclonal Protein                             N/A                   g/dL  SPEP/MELISSA Interpretation                   See Note  Normal SPEP pattern. Immunofixation electrophoresis (MELISSA)  is a more sensitive technique for the identification of  small M-proteins. EER Protein Electrophoresis, Serum        See Note  Authorized individuals can access the [facility]  Enhanced Report using the following link:    [email or web address]  Performed By: [facility]  [place], [address] [facility]  :                            Lydia Maya MD, PhD     Admission on 11/21/2022, Discharged on 11/21/2022   Component Date Value Ref Range Status    WBC 11/21/2022 10.3  4.8 - 10.8 thou/mm3 Final    RBC 11/21/2022 4.82  4.20 - 5.40 mill/mm3 Final    Hemoglobin 11/21/2022 14.2  12.0 - 16.0 gm/dl Final    Hematocrit 11/21/2022 43.6  37.0 - 47.0 % Final    MCV 11/21/2022 90.5  81.0 - 99.0 fL Final    MCH 11/21/2022 29.5  26.0 - 33.0 pg Final    MCHC 11/21/2022 32.6  32.2 - 35.5 gm/dl Final    RDW-CV 11/21/2022 13.1  11.5 - 14.5 % Final    RDW-SD 11/21/2022 43.1  35.0 - 45.0 fL Final    Platelets 26/19/6354 277  130 - 400 thou/mm3 Final    MPV 11/21/2022 9.8  9.4 - 12.4 fL Final    Seg Neutrophils 11/21/2022 68.9  % Final    Lymphocytes 11/21/2022 21.6  % Final    Monocytes 11/21/2022 5.7  % Final    Eosinophils 11/21/2022 2.7  % Final    Basophils 11/21/2022 0.6  % Final    Immature Granulocytes 11/21/2022 0.5  % Final    Segs Absolute 11/21/2022 7.1  1.8 - 7.7 thou/mm3 Final    Lymphocytes Absolute 11/21/2022 2.2  1.0 - 4.8 thou/mm3 Final    Monocytes Absolute 11/21/2022 0.6  0.4 - 1.3 thou/mm3 Final    Eosinophils Absolute 11/21/2022 0.3  0.0 - 0.4 thou/mm3 Final    Basophils Absolute 11/21/2022 0.1  0.0 - 0.1 thou/mm3 Final    Immature Grans (Abs) 11/21/2022 0.05  0.00 - 0.07 thou/mm3 Final    nRBC 11/21/2022 0  /100 wbc Final    Performed at [address] [place] [address], [address]    Glucose 11/21/2022 95  70 - 108 mg/dL Final    Creatinine 11/21/2022 0.7  0.4 - 1.2 mg/dL Final    BUN 11/21/2022 9  7 - 22 mg/dL Final    Sodium 11/21/2022 140  135 - 145 meq/L Final    Potassium 11/21/2022 3.5  3.5 - 5.2 meq/L Final    Comment: Low level specimen hemolysis is present as indicated by the interference level  index on the Roche analyzer. The reported K+ level may be falsely increased. If clinically warranted, recollection of the specimen is suggested. Chloride 11/21/2022 101  98 - 111 meq/L Final    CO2 11/21/2022 30  23 - 33 meq/L Final    Calcium 11/21/2022 9.2  8.5 - 10.5 mg/dL Final    AST 11/21/2022 14  5 - 40 U/L Final    Alkaline Phosphatase 11/21/2022 74  38 - 126 U/L Final    Total Protein 11/21/2022 6.6  6.1 - 8.0 g/dL Final    Albumin 11/21/2022 4.3  3.5 - 5.1 g/dL Final    Total Bilirubin 11/21/2022 0.8  0.3 - 1.2 mg/dL Final    ALT 11/21/2022 <5 (A)  11 - 66 U/L Final    Performed at [address] [place] [address], [address]    Lipase 11/21/2022 35.2  5.6 - 51.3 U/L Final    Performed at [address] [place] [address], [address]    Troponin T 11/21/2022 < 0.010  ng/ml Final    Comment: <0.010 ng/ml        Normal  > or = 0.010 ng/ml  Elevated   (99%)                      Consistent with myocardial damage    Cardiac troponin values can be elevated by many disease states in addition  to acute ischemia.  These include, but are not limited to: chronic renal  failure, CHF, CVA, pulmonary embolus, COPD, myocardial trauma/surgery,  myocarditis, pericarditis, tachycardia, aortic dissection, amyloidosis,  sepsis and strenuous exercise. Serial measurement of troponin is strongly  recommended as a first step in determining whether a low level elevation  represents an acute or chronic condition. Performed at [facility], [address]      D-Dimer, Quant 11/21/2022 < 215.00  0.00 - 500.00 ng/ml FEU Final    Comment: NEGATIVE REFERENCE RANGE:  0-500 ng/mL(FEU)    NEGATIVE:  With a low to medium pretest probability  (Wells  score), a normal result rules out a pulmonary embolism and  venous thrombosis. POSITIVE REFERENCE RANGE:  >500 ng/mL(FEU)    POSITIVE:  It is recommended that when an abnormal D-Dimer  concentration (>500 ng/mL) is obtained along with clinically  significant risk factors, symptoms or signs of DVT or PE, that  further diagnostic testing be performed. Performed at [address] [place] [address], [address]      CRP 11/21/2022 < 0.30  0.00 - 1.00 mg/dl Final    Performed at [address] [place] [address], [address]    Eunice Rickers Rate 11/21/2022 >60  >60 ml/min/1.73m2 Final    Comment: Pediatric calculator link  [facility].[email or web address]. org/professionals/kdoqi/gfr_calculatorped  Effective Oct 3, 2022  These results are not intended for use in patients  <25years of age. eGFR results are calculated without a race factor  using the 2021 CKD-EPI equation. Careful clinical  correlation is recommended, particularly when comparing  to results calculated using previous equations. The  CKD-EPI equation is less accurate in patients with  extremes of muscle mass, extra-renal metabolism of  creatinine, excessive creatine ingestion, or following  therapy that affects renal tubular secretion.   Performed at [address] [place] [address], [address]      Anion Gap 11/21/2022 9.0  8.0 - 16.0 meq/L Final    Comment: ANION GAP = Sodium -(Chloride + CO2)  Performed at [address] [place] [address], [address] Osmolality Calc 11/21/2022 277.9  275.0 - 300.0 mOsmol/kg Final    Performed at [address] [place] [address], [address]    Vitamin B-12 11/21/2022 247  211 - 911 pg/mL Final    Folate 11/21/2022 11.6  4.8 - 24.2 ng/mL Final    Performed at [address] [place] [address], [address]    TSH 11/21/2022 1.310  0.400 - 4.200 uIU/mL Final    Performed at [address] [place] [address], [facility]        MRI brain 12/15/2022 (reviewed)    Impression    Minimal abnormal signal in the white matter of the brain. These foci are tiny and faint. This could be the sequela prior trauma, early chronic small vessel ischemic changes or this qualifies chronic headaches. There are no findings pathognomonic for    demyelination                   **This report has been created using voice recognition software. It may contain minor errors which are inherent in voice recognition technology. **           Final report electronically signed by Dr. Alejo Villagomez on 12/15/2022 2:03 PM     MRI thoracic spine 12/15/2022    Impression       1. Normal thoracic spinal cord. 2. Normal MRI of the thoracic spine. **This report has been created using voice recognition software. It may contain minor errors which are inherent in voice recognition technology. **       Final report electronically signed by Dr. Alejo Villagomez on 12/15/2022 1:44 PM         ASSESSMENT/PLAN:  1. Left facial numbness  2. Left sided numbness  3. Trunk numbness  4. Left leg numbness      Patient is seen as follow-up for left side numbness, she reports her symptoms are the same, no changes reported. She underwent testing including MRI thoracic spine, and brain, in addition to lumbar puncture, she is evaluated via video link with her family present to discuss results of testing and plan going forward. Of note the lumbar puncture results were unremarkable, Lyme in the CSF sample was not run due to technical reasons.   However the serum vitamin B6 level was low=17. The patient was asked to take vitamin B6 supplements, 50 mg daily, over the counter. She denies any new symptoms. The patient and her spouse were counseled about her symptoms, work-up obtained, and further recommended evaluations. After detailed discussion with patient we agreed on the following plan. Plan:  Referral neurology [facility] regarding left side numbness second opinion. Continue with vitamin B6 supplements 50 mg daily for 2 months repeat level  Follow-up in 2 to 3 months  Call with any questions or concerns        Return in about 3 months (around 3/23/2023). Gilda Scott, was evaluated through a synchronous (real-time) audio-video encounter. The patient (or guardian if applicable) is aware that this is a billable service, which includes applicable co-pays. This Virtual Visit was conducted with patient's (and/or legal guardian's) consent. The visit was conducted pursuant to the emergency declaration under the [address] [place] [address], [address] [place] [address] authority and the [employer] Act. Patient identification was verified, and a caregiver was present when appropriate. The patient was located at Home: [address] [facility],[address]  [address]. Provider was located at [facility] ([address] [place], [place] [address]t): [facility] [address],  [address]. Total time spent on this encounter:  24 min    --Scarlett Purcell MD on 12/23/2022 at 11:18 AM    An electronic signature was used to authenticate this note.

## 2022-12-23 NOTE — PATIENT INSTRUCTIONS
Referral neurology [facility] regarding left side numbness second opinion.   Continue with vitamin B6 supplements 50 mg daily for 2 months repeat level  Follow-up in 2 to 3 months  Call with any questions or concerns

## 2023-01-05 ENCOUNTER — HOSPITAL ENCOUNTER (OUTPATIENT)
Age: 33
Discharge: HOME | End: 2023-01-05
Payer: COMMERCIAL

## 2023-01-05 LAB
EKG ATRIAL RATE: 85 BPM
EKG P AXIS: 53 DEGREES
EKG P-R INTERVAL: 132 MS
EKG Q-T INTERVAL: 360 MS
EKG QRS DURATION: 82 MS
EKG QTC CALCULATION (BAZETT): 428 MS
EKG R AXIS: 51 DEGREES
EKG T AXIS: 64 DEGREES
EKG VENTRICULAR RATE: 85 BPM

## 2023-01-05 PROCEDURE — 93005 ELECTROCARDIOGRAM TRACING: CPT | Performed by: FAMILY MEDICINE

## 2023-03-08 ENCOUNTER — TELEPHONE (OUTPATIENT)
Dept: NEUROLOGY | Age: 33
End: 2023-03-08

## 2023-03-08 DIAGNOSIS — R20.0 NUMBNESS: ICD-10-CM

## 2023-03-08 DIAGNOSIS — R20.0 LEFT LEG NUMBNESS: ICD-10-CM

## 2023-03-08 DIAGNOSIS — R20.0 LEFT FACIAL NUMBNESS: Primary | ICD-10-CM

## 2023-03-08 DIAGNOSIS — R20.0 LEFT SIDED NUMBNESS: ICD-10-CM

## 2023-03-08 NOTE — TELEPHONE ENCOUNTER
Patient called requesting to have repeat lab orders for B6, B12 and vitamin D. Please advise.    Thank you

## 2023-03-09 ENCOUNTER — HOSPITAL ENCOUNTER (OUTPATIENT)
Age: 33
Discharge: HOME | End: 2023-03-09
Payer: COMMERCIAL

## 2023-03-09 DIAGNOSIS — R20.0 LEFT SIDED NUMBNESS: ICD-10-CM

## 2023-03-09 DIAGNOSIS — R20.0 LEFT FACIAL NUMBNESS: ICD-10-CM

## 2023-03-09 DIAGNOSIS — R20.0 LEFT LEG NUMBNESS: ICD-10-CM

## 2023-03-09 DIAGNOSIS — R20.0 NUMBNESS: ICD-10-CM

## 2023-03-09 LAB
25(OH)D3 SERPL-MCNC: 32 NG/ML (ref 30–100)
FOLATE SERPL-MCNC: 12.8 NG/ML (ref 4.8–24.2)
VIT B12 SERPL-MCNC: 754 PG/ML (ref 211–911)

## 2023-03-09 PROCEDURE — 84207 ASSAY OF VITAMIN B-6: CPT

## 2023-03-09 PROCEDURE — 82607 VITAMIN B-12: CPT

## 2023-03-09 PROCEDURE — 36415 COLL VENOUS BLD VENIPUNCTURE: CPT

## 2023-03-09 PROCEDURE — 82306 VITAMIN D 25 HYDROXY: CPT

## 2023-03-09 PROCEDURE — 82746 ASSAY OF FOLIC ACID SERUM: CPT

## 2023-05-15 ENCOUNTER — OFFICE VISIT (OUTPATIENT)
Dept: NEUROLOGY | Age: 33
End: 2023-05-15
Payer: COMMERCIAL

## 2023-05-15 VITALS
HEART RATE: 94 BPM | BODY MASS INDEX: 27.8 KG/M2 | OXYGEN SATURATION: 98 % | SYSTOLIC BLOOD PRESSURE: 110 MMHG | DIASTOLIC BLOOD PRESSURE: 75 MMHG | HEIGHT: 66 IN | WEIGHT: 173 LBS

## 2023-05-15 DIAGNOSIS — R20.0 NUMBNESS: ICD-10-CM

## 2023-05-15 DIAGNOSIS — R20.0 LEFT FACIAL NUMBNESS: Primary | ICD-10-CM

## 2023-05-15 DIAGNOSIS — R20.0 LEFT SIDED NUMBNESS: ICD-10-CM

## 2023-05-15 PROCEDURE — 99214 OFFICE O/P EST MOD 30 MIN: CPT | Performed by: PSYCHIATRY & NEUROLOGY

## 2023-07-24 ENCOUNTER — APPOINTMENT (OUTPATIENT)
Dept: GENERAL RADIOLOGY | Age: 33
End: 2023-07-24
Payer: COMMERCIAL

## 2023-07-24 ENCOUNTER — HOSPITAL ENCOUNTER (EMERGENCY)
Age: 33
Discharge: HOME | End: 2023-07-24
Payer: COMMERCIAL

## 2023-07-24 VITALS
SYSTOLIC BLOOD PRESSURE: 128 MMHG | RESPIRATION RATE: 14 BRPM | DIASTOLIC BLOOD PRESSURE: 73 MMHG | BODY MASS INDEX: 27.32 KG/M2 | HEIGHT: 66 IN | HEART RATE: 75 BPM | TEMPERATURE: 98 F | WEIGHT: 170 LBS | OXYGEN SATURATION: 99 %

## 2023-07-24 DIAGNOSIS — S59.801A HYPEREXTENSION INJURY OF RIGHT ELBOW, INITIAL ENCOUNTER: Primary | ICD-10-CM

## 2023-07-24 PROCEDURE — 99213 OFFICE O/P EST LOW 20 MIN: CPT

## 2023-07-24 PROCEDURE — 73080 X-RAY EXAM OF ELBOW: CPT

## 2023-07-24 PROCEDURE — 99202 OFFICE O/P NEW SF 15 MIN: CPT | Performed by: NURSE PRACTITIONER

## 2023-07-24 RX ORDER — IBUPROFEN 200 MG
800 TABLET ORAL EVERY 8 HOURS PRN
Qty: 120 TABLET | Refills: 3 | COMMUNITY
Start: 2023-07-24

## 2023-07-24 RX ORDER — METHYLPREDNISOLONE 4 MG/1
TABLET ORAL
Qty: 1 KIT | Refills: 0 | Status: SHIPPED | OUTPATIENT
Start: 2023-07-24 | End: 2023-07-30

## 2023-07-24 ASSESSMENT — PAIN DESCRIPTION - ONSET: ONSET: SUDDEN

## 2023-07-24 ASSESSMENT — PAIN - FUNCTIONAL ASSESSMENT
PAIN_FUNCTIONAL_ASSESSMENT: PREVENTS OR INTERFERES SOME ACTIVE ACTIVITIES AND ADLS
PAIN_FUNCTIONAL_ASSESSMENT: 0-10

## 2023-07-24 ASSESSMENT — PAIN SCALES - GENERAL: PAINLEVEL_OUTOF10: 2

## 2023-07-24 ASSESSMENT — PAIN DESCRIPTION - ORIENTATION: ORIENTATION: RIGHT

## 2023-07-24 ASSESSMENT — PAIN DESCRIPTION - FREQUENCY: FREQUENCY: CONTINUOUS

## 2023-07-24 ASSESSMENT — PAIN DESCRIPTION - DESCRIPTORS: DESCRIPTORS: SHARP;STABBING

## 2023-07-24 ASSESSMENT — PAIN DESCRIPTION - LOCATION: LOCATION: ELBOW

## 2023-07-24 ASSESSMENT — PAIN DESCRIPTION - PAIN TYPE: TYPE: ACUTE PAIN

## 2023-07-24 NOTE — ED NOTES
PT GIVEN DISCHARGE INSTRUCTIONS, VERBALIZES UNDERSTANDING. PT ASSESSMENT UNCHANGED, DISCHARGED IN STABLE CONDITION.          Alejandirna Tenorio RN  07/24/23 [id number]

## 2023-07-24 NOTE — ED PROVIDER NOTES
[address] [facility]  Urgent Care Encounter       CHIEF COMPLAINT       Chief Complaint   Patient presents with    Elbow Injury     Injured right elbow tubing on saturday       Nurses Notes reviewed and I agree except as noted in the HPI. HISTORY OF PRESENT ILLNESS   Kylie Pandey is a 35 y.o. female who presents with complaints of left elbow discomfort on extension. This started 10 days ago after she injured it while water tubing. Patient denies any pain at rest.  Reports pain with extension only. Has tried ibuprofen. The treatment was ineffective. Denies any numbness or tingling or radiation of pain. The history is provided by the patient. REVIEW OF SYSTEMS     Review of Systems   Constitutional:  Negative for activity change. Musculoskeletal:  Positive for arthralgias (right elbow). Negative for joint swelling and myalgias. Skin:  Negative for wound. Neurological:  Negative for weakness and numbness. PAST MEDICAL HISTORY         Diagnosis Date    Heart abnormality     tachy during pregnancy    Infertility, female     took 1 round of clomid first preg    PONV (postoperative nausea and vomiting)     Postpartum depression     on meds post partum with 1st baby    Prolonged emergence from general anesthesia        SURGICALHISTORY     Patient  has a past surgical history that includes [place] tooth extraction; Tonsillectomy (12/2018); Cholecystectomy, laparoscopic (N/A, 4/27/2020); and laparoscopy (N/A, 12/15/2022).     CURRENT MEDICATIONS       Discharge Medication List as of 7/24/2023  6:29 PM        CONTINUE these medications which have NOT CHANGED    Details   pyridoxine (B-6) 100 MG tablet Take 0.5 tablets by mouth dailyHistorical Med      Cyanocobalamin (VITAMIN B 12 PO) Take by mouthHistorical Med      VITAMIN D PO Take by mouthHistorical Med      acetaminophen (TYLENOL) 500 MG tablet Take 2 tablets by mouth every 6 hours as needed for PainHistorical Med      sertraline (ZOLOFT)

## 2024-07-18 ENCOUNTER — LAB (OUTPATIENT)
Dept: LAB | Age: 34
End: 2024-07-18

## 2024-07-22 NOTE — PROGRESS NOTES
PAT call attempted, patient unavailable, left message to please call us back at your earliest convenience; [phone]

## 2024-07-23 NOTE — PROGRESS NOTES
NPO after midnight  Bring insurance info and drivers license  Wear comfortable clean clothing  Do not bring jewelry  Shower night before and morning of surgery with a liquid antibacterial soap  Bring list of medications with dosage and how often taken  Follow all instructions given by your physician   needed at discharge  You must have a responsible adult with you day of surgery and for 24 hours after surgery  Call [phone] for any questions

## 2024-07-30 ENCOUNTER — HOSPITAL ENCOUNTER (OUTPATIENT)
Age: 34
Setting detail: OUTPATIENT SURGERY
Discharge: HOME | End: 2024-07-30
Attending: OBSTETRICS & GYNECOLOGY | Admitting: OBSTETRICS & GYNECOLOGY
Payer: COMMERCIAL

## 2024-07-30 ENCOUNTER — ANESTHESIA (OUTPATIENT)
Dept: OPERATING ROOM | Age: 34
End: 2024-07-30
Payer: COMMERCIAL

## 2024-07-30 ENCOUNTER — ANESTHESIA EVENT (OUTPATIENT)
Dept: OPERATING ROOM | Age: 34
End: 2024-07-30
Payer: COMMERCIAL

## 2024-07-30 VITALS
RESPIRATION RATE: 12 BRPM | WEIGHT: 192.8 LBS | DIASTOLIC BLOOD PRESSURE: 68 MMHG | OXYGEN SATURATION: 97 % | SYSTOLIC BLOOD PRESSURE: 116 MMHG | HEIGHT: 66 IN | TEMPERATURE: 96.2 F | BODY MASS INDEX: 30.98 KG/M2 | HEART RATE: 96 BPM

## 2024-07-30 DIAGNOSIS — N93.8 DYSFUNCTIONAL UTERINE BLEEDING: ICD-10-CM

## 2024-07-30 LAB — PREGNANCY, URINE: NEGATIVE

## 2024-07-30 PROCEDURE — 3700000001 HC ADD 15 MINUTES (ANESTHESIA): Performed by: OBSTETRICS & GYNECOLOGY

## 2024-07-30 PROCEDURE — 7100000010 HC PHASE II RECOVERY - FIRST 15 MIN: Performed by: OBSTETRICS & GYNECOLOGY

## 2024-07-30 PROCEDURE — 2500000003 HC RX 250 WO HCPCS: Performed by: STUDENT IN AN ORGANIZED HEALTH CARE EDUCATION/TRAINING PROGRAM

## 2024-07-30 PROCEDURE — 7100000011 HC PHASE II RECOVERY - ADDTL 15 MIN: Performed by: OBSTETRICS & GYNECOLOGY

## 2024-07-30 PROCEDURE — 2709999900 HC NON-CHARGEABLE SUPPLY: Performed by: OBSTETRICS & GYNECOLOGY

## 2024-07-30 PROCEDURE — 2720000010 HC SURG SUPPLY STERILE: Performed by: OBSTETRICS & GYNECOLOGY

## 2024-07-30 PROCEDURE — C1886 CATHETER, ABLATION: HCPCS | Performed by: OBSTETRICS & GYNECOLOGY

## 2024-07-30 PROCEDURE — 3600000003 HC SURGERY LEVEL 3 BASE: Performed by: OBSTETRICS & GYNECOLOGY

## 2024-07-30 PROCEDURE — 7100000001 HC PACU RECOVERY - ADDTL 15 MIN: Performed by: OBSTETRICS & GYNECOLOGY

## 2024-07-30 PROCEDURE — 81025 URINE PREGNANCY TEST: CPT

## 2024-07-30 PROCEDURE — 6360000002 HC RX W HCPCS: Performed by: OBSTETRICS & GYNECOLOGY

## 2024-07-30 PROCEDURE — 7100000000 HC PACU RECOVERY - FIRST 15 MIN: Performed by: OBSTETRICS & GYNECOLOGY

## 2024-07-30 PROCEDURE — 6360000002 HC RX W HCPCS: Performed by: STUDENT IN AN ORGANIZED HEALTH CARE EDUCATION/TRAINING PROGRAM

## 2024-07-30 PROCEDURE — 2580000003 HC RX 258: Performed by: STUDENT IN AN ORGANIZED HEALTH CARE EDUCATION/TRAINING PROGRAM

## 2024-07-30 PROCEDURE — 88305 TISSUE EXAM BY PATHOLOGIST: CPT

## 2024-07-30 PROCEDURE — 3600000013 HC SURGERY LEVEL 3 ADDTL 15MIN: Performed by: OBSTETRICS & GYNECOLOGY

## 2024-07-30 PROCEDURE — 3700000000 HC ANESTHESIA ATTENDED CARE: Performed by: OBSTETRICS & GYNECOLOGY

## 2024-07-30 RX ORDER — DEXAMETHASONE SODIUM PHOSPHATE 4 MG/ML
INJECTION, SOLUTION INTRA-ARTICULAR; INTRALESIONAL; INTRAMUSCULAR; INTRAVENOUS; SOFT TISSUE PRN
Status: DISCONTINUED | OUTPATIENT
Start: 2024-07-30 | End: 2024-07-30 | Stop reason: SDUPTHER

## 2024-07-30 RX ORDER — SODIUM CHLORIDE 0.9 % (FLUSH) 0.9 %
5-40 SYRINGE (ML) INJECTION EVERY 12 HOURS SCHEDULED
Status: DISCONTINUED | OUTPATIENT
Start: 2024-07-30 | End: 2024-07-30 | Stop reason: HOSPADM

## 2024-07-30 RX ORDER — ACETAMINOPHEN 500 MG
1000 TABLET ORAL EVERY 8 HOURS PRN
Status: DISCONTINUED | OUTPATIENT
Start: 2024-07-30 | End: 2024-07-30 | Stop reason: HOSPADM

## 2024-07-30 RX ORDER — SODIUM CHLORIDE 0.9 % (FLUSH) 0.9 %
5-40 SYRINGE (ML) INJECTION PRN
Status: DISCONTINUED | OUTPATIENT
Start: 2024-07-30 | End: 2024-07-30 | Stop reason: HOSPADM

## 2024-07-30 RX ORDER — ONDANSETRON 2 MG/ML
8 INJECTION INTRAMUSCULAR; INTRAVENOUS EVERY 8 HOURS PRN
Status: DISCONTINUED | OUTPATIENT
Start: 2024-07-30 | End: 2024-07-30 | Stop reason: HOSPADM

## 2024-07-30 RX ORDER — ONDANSETRON 2 MG/ML
4 INJECTION INTRAMUSCULAR; INTRAVENOUS
Status: DISCONTINUED | OUTPATIENT
Start: 2024-07-30 | End: 2024-07-30 | Stop reason: HOSPADM

## 2024-07-30 RX ORDER — SODIUM CHLORIDE 9 MG/ML
INJECTION, SOLUTION INTRAVENOUS PRN
Status: DISCONTINUED | OUTPATIENT
Start: 2024-07-30 | End: 2024-07-30 | Stop reason: HOSPADM

## 2024-07-30 RX ORDER — MEPERIDINE HYDROCHLORIDE 25 MG/ML
12.5 INJECTION INTRAMUSCULAR; INTRAVENOUS; SUBCUTANEOUS EVERY 5 MIN PRN
Status: DISCONTINUED | OUTPATIENT
Start: 2024-07-30 | End: 2024-07-30 | Stop reason: HOSPADM

## 2024-07-30 RX ORDER — SODIUM CHLORIDE, SODIUM LACTATE, POTASSIUM CHLORIDE, CALCIUM CHLORIDE 600; 310; 30; 20 MG/100ML; MG/100ML; MG/100ML; MG/100ML
INJECTION, SOLUTION INTRAVENOUS CONTINUOUS
Status: DISCONTINUED | OUTPATIENT
Start: 2024-07-30 | End: 2024-07-30 | Stop reason: HOSPADM

## 2024-07-30 RX ORDER — MORPHINE SULFATE 2 MG/ML
4 INJECTION, SOLUTION INTRAMUSCULAR; INTRAVENOUS
Status: DISCONTINUED | OUTPATIENT
Start: 2024-07-30 | End: 2024-07-30 | Stop reason: HOSPADM

## 2024-07-30 RX ORDER — SODIUM CHLORIDE 9 MG/ML
INJECTION, SOLUTION INTRAVENOUS CONTINUOUS PRN
Status: DISCONTINUED | OUTPATIENT
Start: 2024-07-30 | End: 2024-07-30 | Stop reason: SDUPTHER

## 2024-07-30 RX ORDER — FENTANYL CITRATE 50 UG/ML
50 INJECTION, SOLUTION INTRAMUSCULAR; INTRAVENOUS EVERY 5 MIN PRN
Status: DISCONTINUED | OUTPATIENT
Start: 2024-07-30 | End: 2024-07-30 | Stop reason: HOSPADM

## 2024-07-30 RX ORDER — DROPERIDOL 2.5 MG/ML
0.62 INJECTION, SOLUTION INTRAMUSCULAR; INTRAVENOUS ONCE
Status: COMPLETED | OUTPATIENT
Start: 2024-07-30 | End: 2024-07-30

## 2024-07-30 RX ORDER — DROPERIDOL 2.5 MG/ML
INJECTION, SOLUTION INTRAMUSCULAR; INTRAVENOUS
Status: DISCONTINUED
Start: 2024-07-30 | End: 2024-07-30 | Stop reason: HOSPADM

## 2024-07-30 RX ORDER — LIDOCAINE HYDROCHLORIDE 20 MG/ML
INJECTION, SOLUTION EPIDURAL; INFILTRATION; INTRACAUDAL; PERINEURAL PRN
Status: DISCONTINUED | OUTPATIENT
Start: 2024-07-30 | End: 2024-07-30 | Stop reason: SDUPTHER

## 2024-07-30 RX ORDER — SODIUM CHLORIDE, SODIUM LACTATE, POTASSIUM CHLORIDE, CALCIUM CHLORIDE 600; 310; 30; 20 MG/100ML; MG/100ML; MG/100ML; MG/100ML
INJECTION, SOLUTION INTRAVENOUS SEE ADMIN INSTRUCTIONS
Status: DISCONTINUED | OUTPATIENT
Start: 2024-07-30 | End: 2024-07-30 | Stop reason: HOSPADM

## 2024-07-30 RX ORDER — ONDANSETRON 2 MG/ML
INJECTION INTRAMUSCULAR; INTRAVENOUS PRN
Status: DISCONTINUED | OUTPATIENT
Start: 2024-07-30 | End: 2024-07-30 | Stop reason: SDUPTHER

## 2024-07-30 RX ORDER — BUPIVACAINE HYDROCHLORIDE 5 MG/ML
INJECTION, SOLUTION EPIDURAL; INTRACAUDAL PRN
Status: DISCONTINUED | OUTPATIENT
Start: 2024-07-30 | End: 2024-07-30 | Stop reason: ALTCHOICE

## 2024-07-30 RX ORDER — ROCURONIUM BROMIDE 10 MG/ML
INJECTION, SOLUTION INTRAVENOUS PRN
Status: DISCONTINUED | OUTPATIENT
Start: 2024-07-30 | End: 2024-07-30 | Stop reason: SDUPTHER

## 2024-07-30 RX ORDER — KETOROLAC TROMETHAMINE 30 MG/ML
30 INJECTION, SOLUTION INTRAMUSCULAR; INTRAVENOUS ONCE
Status: COMPLETED | OUTPATIENT
Start: 2024-07-30 | End: 2024-07-30

## 2024-07-30 RX ORDER — FENTANYL CITRATE 50 UG/ML
INJECTION, SOLUTION INTRAMUSCULAR; INTRAVENOUS PRN
Status: DISCONTINUED | OUTPATIENT
Start: 2024-07-30 | End: 2024-07-30 | Stop reason: SDUPTHER

## 2024-07-30 RX ORDER — OXYCODONE HYDROCHLORIDE 5 MG/1
5 TABLET ORAL EVERY 4 HOURS PRN
Status: DISCONTINUED | OUTPATIENT
Start: 2024-07-30 | End: 2024-07-30 | Stop reason: HOSPADM

## 2024-07-30 RX ORDER — MORPHINE SULFATE 2 MG/ML
2 INJECTION, SOLUTION INTRAMUSCULAR; INTRAVENOUS
Status: DISCONTINUED | OUTPATIENT
Start: 2024-07-30 | End: 2024-07-30 | Stop reason: HOSPADM

## 2024-07-30 RX ORDER — DIPHENHYDRAMINE HYDROCHLORIDE 50 MG/ML
12.5 INJECTION INTRAMUSCULAR; INTRAVENOUS
Status: DISCONTINUED | OUTPATIENT
Start: 2024-07-30 | End: 2024-07-30 | Stop reason: HOSPADM

## 2024-07-30 RX ORDER — NALOXONE HYDROCHLORIDE 0.4 MG/ML
INJECTION, SOLUTION INTRAMUSCULAR; INTRAVENOUS; SUBCUTANEOUS PRN
Status: DISCONTINUED | OUTPATIENT
Start: 2024-07-30 | End: 2024-07-30 | Stop reason: HOSPADM

## 2024-07-30 RX ORDER — OXYCODONE HYDROCHLORIDE 5 MG/1
10 TABLET ORAL EVERY 4 HOURS PRN
Status: DISCONTINUED | OUTPATIENT
Start: 2024-07-30 | End: 2024-07-30 | Stop reason: HOSPADM

## 2024-07-30 RX ORDER — ONDANSETRON 4 MG/1
4 TABLET, ORALLY DISINTEGRATING ORAL EVERY 8 HOURS PRN
Status: DISCONTINUED | OUTPATIENT
Start: 2024-07-30 | End: 2024-07-30 | Stop reason: HOSPADM

## 2024-07-30 RX ORDER — ONDANSETRON 2 MG/ML
4 INJECTION INTRAMUSCULAR; INTRAVENOUS EVERY 6 HOURS PRN
Status: DISCONTINUED | OUTPATIENT
Start: 2024-07-30 | End: 2024-07-30 | Stop reason: HOSPADM

## 2024-07-30 RX ORDER — IBUPROFEN 200 MG
600 TABLET ORAL EVERY 8 HOURS PRN
Status: DISCONTINUED | OUTPATIENT
Start: 2024-07-30 | End: 2024-07-30 | Stop reason: HOSPADM

## 2024-07-30 RX ORDER — SUCCINYLCHOLINE CHLORIDE 20 MG/ML
INJECTION INTRAMUSCULAR; INTRAVENOUS PRN
Status: DISCONTINUED | OUTPATIENT
Start: 2024-07-30 | End: 2024-07-30 | Stop reason: SDUPTHER

## 2024-07-30 RX ORDER — OXYCODONE HYDROCHLORIDE 5 MG/1
5 TABLET ORAL EVERY 6 HOURS PRN
Qty: 20 TABLET | Refills: 0 | Status: SHIPPED | OUTPATIENT
Start: 2024-07-30 | End: 2024-08-06

## 2024-07-30 RX ADMIN — SUGAMMADEX 200 MG: 100 INJECTION, SOLUTION INTRAVENOUS at 13:08

## 2024-07-30 RX ADMIN — SODIUM CHLORIDE: 9 INJECTION, SOLUTION INTRAVENOUS at 12:27

## 2024-07-30 RX ADMIN — KETOROLAC TROMETHAMINE 30 MG: 30 INJECTION, SOLUTION INTRAMUSCULAR at 12:14

## 2024-07-30 RX ADMIN — DROPERIDOL 0.62 MG: 2.5 INJECTION, SOLUTION INTRAMUSCULAR; INTRAVENOUS at 14:31

## 2024-07-30 RX ADMIN — ROCURONIUM BROMIDE 30 MG: 10 INJECTION INTRAVENOUS at 12:37

## 2024-07-30 RX ADMIN — FENTANYL CITRATE 50 MCG: 50 INJECTION, SOLUTION INTRAMUSCULAR; INTRAVENOUS at 12:30

## 2024-07-30 RX ADMIN — Medication 120 MG: at 12:32

## 2024-07-30 RX ADMIN — ROCURONIUM BROMIDE 10 MG: 10 INJECTION INTRAVENOUS at 12:30

## 2024-07-30 RX ADMIN — FENTANYL CITRATE 50 MCG: 50 INJECTION, SOLUTION INTRAMUSCULAR; INTRAVENOUS at 13:10

## 2024-07-30 RX ADMIN — ONDANSETRON 4 MG: 2 INJECTION INTRAMUSCULAR; INTRAVENOUS at 12:46

## 2024-07-30 RX ADMIN — DEXAMETHASONE SODIUM PHOSPHATE 10 MG: 4 INJECTION, SOLUTION INTRAMUSCULAR; INTRAVENOUS at 12:39

## 2024-07-30 RX ADMIN — LIDOCAINE HYDROCHLORIDE 100 MG: 20 INJECTION, SOLUTION EPIDURAL; INFILTRATION; INTRACAUDAL; PERINEURAL at 12:30

## 2024-07-30 RX ADMIN — PROPOFOL 160 MG: 10 INJECTION, EMULSION INTRAVENOUS at 12:30

## 2024-07-30 ASSESSMENT — PAIN - FUNCTIONAL ASSESSMENT: PAIN_FUNCTIONAL_ASSESSMENT: 0-10

## 2024-07-30 NOTE — PROGRESS NOTES
1314 To recovery via cart. Spont resp. VSS. Report received from surgical rn and Dr Giron. IV infusing KVO. SCDs applied bilat. Steri strips dressing clean and intact over three incision on lower abd. Tess pad in place clean and dry. Ice pack to lower abd. Pt arouses to name calling. No complaints  1320 Remains restful and stable  1325 Stable  1330 Condition unchanged  1335 Continues to rest   1340 Resting comfortably  1345 Meets criteria for transfer to phase II recovery care. Transfer via cart.   1350 In phase II recovery.  in room. Denies desire for drink or snack. Cooling device applied.   1355 Small amount green emesis. HOB elevated. Cool washcloth applied to forehead  1400 States feeling better. Denies nausea.  1405 Restful. Denies needs.  in room  1425 Vomiting green emesis  1431 0.625 mg Droperidol IV for nausea. No new drainage seen on tess pad or abd dressings  1500 States no longer nauseated but denies need for food or drink.  in room  1525 Up to dress with  assist  1540 IV discontinued.  1550 Discharge to home in stable ambulatory condition with

## 2024-07-30 NOTE — H&P
[facility]  History and Physical Update    Pt Name: Elsa Shelton  MRN: [id number]  YOB: 1990  Date of evaluation: 7/30/2024    [x] I have examined the patient and reviewed the H&P/Consult and there are no changes to the patient or plans.  Plan for L/S with BS, hysteroscopy, D&C, MARIO polypectomy    [] I have examined the patient and reviewed the H&P/Consult and have noted the following changes:            Elsa Stein MD,MD  Electronically signed 7/30/2024 at 11:38 AM

## 2024-07-30 NOTE — DISCHARGE INSTRUCTIONS
DISCHARGE INSTRUCTIONS  Laparoscopy  Dr. Elsa Stein     GENERAL ANESTHESIA OR SEDATION  Do Not Drive Today  Do Not Consume Alcohol for 48 hours  Resume prescription medications as instructed  You should have someone with you tonight at home.    DIET  Start with liquids - drink extra fluids the next 24-48 hours.  Progress to soft diet as able    ACTIVITY  Rest for the remainder of the day  May begin to drive after 24 hours  May begin to lift over 10 lbs. after 24 hours  May return to normal activities and work after 48 hours    OPERATIVE SITE CARE  May shower or bathe  Cleanse abdominal incisions with alcohol three times per day (3 x day)  No Tampons or intercourse until after 14 days  You should expect vaginal drainage for 7-10 days  Throat lozenges or spray will help sore throat    SPECIAL INSTRUCTIONS / MEDICATIONS:          CALL MY OFFICE FOR:  Heavy bleeding  Pain not relieved by medication  Foul smelling vaginal drainage  Redness, swelling, or pus from incisions    IF YOU NEED MEDICAL ATTENTION CALL YOUR PHYSICIAN AT HIS OFFICE, OR LEAVE A MESSAGE WITH THE ANSWERING SERVICE, OR GO TO THE NEAREST EMERGENCY ROOM.    I ACKNOWLEDGE RECEIVING THESE INSTRUCTIONS AND UNDERSTAND THEM           Patient    Significant Other               Nurse   Date

## 2024-07-30 NOTE — BRIEF OP NOTE
Brief Operative Report      Pre-operative Diagnosis:  pelvic pain, menorrhagia, suspected endometrial polyp    Post-operative Diagnosis:  Same    Procedure:  Dx L/S, BS, Hysteroscopy, D&C, MARIO    Surgeon: LANE Stein MD     Anesthesia:  General endotrachial anesthesia    Estimated blood loss:  Less than 50 ml     Findings: See Operative Dictation, Normal uterine cavity, sounding length 8cm, cervical length 4cm, Normal adnexa, adhesions of upper abd of omentum to abd wall    Complications:  none      See dictated operative report for full details.      Elsa Stein MD

## 2024-07-30 NOTE — ANESTHESIA PRE PROCEDURE
Department of Anesthesiology  Preprocedure Note       Name:  Elsa Shelton   Age:  34 y.o.  :  1990                                          MRN:  [id number]         Date:  2024      Surgeon: Surgeon(s):  Elsa Stein MD    Procedure: Procedure(s):  Laparoscopic Bilateral Salpingectomy Hysteroscopy Dilation & Curettage, Endometrial Ablation, Polypectomy    Medications prior to admission:   Prior to Admission medications    Medication Sig Start Date End Date Taking? Authorizing Provider   ibuprofen (ADVIL) 200 MG tablet Take 4 tablets by mouth every 8 hours as needed for Pain 23   Saul Diaz APRN - CNP   pyridoxine (B-6) 100 MG tablet Take 0.5 tablets by mouth daily  Patient not taking: Reported on 2024    Domenic Adan MD   Cyanocobalamin (VITAMIN B 12 PO) Take by mouth  Patient not taking: Reported on 2023    Domenic Adan MD   VITAMIN D PO Take by mouth  Patient not taking: Reported on 2024    Domenic Adan MD   acetaminophen (TYLENOL) 500 MG tablet Take 2 tablets by mouth every 6 hours as needed for Pain    Domenic Adan MD   sertraline (ZOLOFT) 50 MG tablet Take 1 tablet by mouth daily    Domenic Adan MD   Loratadine (CLARITIN PO) Take by mouth    ProviderDomenic MD       Current medications:    Current Facility-Administered Medications   Medication Dose Route Frequency Provider Last Rate Last Admin   • lactated ringers IV soln infusion   IntraVENous Continuous Elsa Stein MD       • sodium chloride flush 0.9 % injection 5-40 mL  5-40 mL IntraVENous 2 times per day Elsa Stein MD       • sodium chloride flush 0.9 % injection 5-40 mL  5-40 mL IntraVENous PRN Elsa Stein MD       • 0.9 % sodium chloride infusion   IntraVENous PRN Elsa Stein MD       • ondansetron (ZOFRAN) injection 8 mg  8 mg IntraVENous Q8H PRN Elsa Stein MD           Allergies:    Allergies   Allergen Reactions   • Flagyl

## 2024-08-02 NOTE — OP NOTE
Gyn Service    Operative Report        Pt Name: Elsa Shelton  MRN: [id number] Acct #: [id number]  YOB: 1990  Procedure Performed By: Elsa Stein MD, MD      Pre-operative Diagnosis:  MENORRHAGIA/MENOMETORRHAGIA, PELVIC PAIN    Post-operative Diagnosis:  SAME    Procedure:  HYSTEROSCOPY, D AND C, NGOZI ENDOMETRIAL ABLATION, LAPAROSCOPIC BILATERAL SALPINGECTOMY    Surgeon:  LANE Stein MD     Anesthesia:  General    Estimated blood loss: less than 50 ml    Findings:  Ut sound to 9 cm     Complications:  NONE      Procedure:     The patient was taken to the operating room where she was placed  under general anesthesia in dorsolithotomy position, prepped and draped.  On  exam, the cervix was mobile and in mid position.  The cervix was grasped with  a tenaculum and sounded to 9 cm and was progressively dilated. A Uterine manipulator was placed and attention turned to the abdomen.    The umbilical skin was injected with marcaine and incised with a scalpel. A 5mm trochar was placed into the abdomen under direct visualization and it was insuflated. 2nd and 3rd ports were placed suprapubic and in the left lower quadrant. The Ligasure was used to ligate along the mesosalpinx to the cornua of the uterus and the tubes were removed through the suprapubic port. All areas were hemostatic and instruments were removed from the abdomen. It was exsuflated and the skin was closed the 4-0 vicryl.     A hysteroscope was placed and saline was used as the distension medium.  Abnormalities of the uterus were not noted.  A sharp curettage then took place until a gritty texture was noted throughout the cavity.  The Ngozi device  was then placed, the intracervical balloon inflated, and the cavity assessment was passed successfully. The ablation was then completed. Once completed the balloon was deflated and the Ngozi removed from the uterine cavity. When it was completed the patient was awakened from general

## 2025-01-07 ENCOUNTER — APPOINTMENT (OUTPATIENT)
Dept: CT IMAGING | Age: 35
End: 2025-01-07
Payer: COMMERCIAL

## 2025-01-07 ENCOUNTER — HOSPITAL ENCOUNTER (EMERGENCY)
Age: 35
Discharge: HOME | End: 2025-01-07
Attending: EMERGENCY MEDICINE
Payer: COMMERCIAL

## 2025-01-07 ENCOUNTER — APPOINTMENT (OUTPATIENT)
Dept: ULTRASOUND IMAGING | Age: 35
End: 2025-01-07
Payer: COMMERCIAL

## 2025-01-07 VITALS
RESPIRATION RATE: 16 BRPM | WEIGHT: 190 LBS | OXYGEN SATURATION: 98 % | HEIGHT: 66 IN | TEMPERATURE: 98.2 F | BODY MASS INDEX: 30.53 KG/M2 | HEART RATE: 87 BPM | SYSTOLIC BLOOD PRESSURE: 125 MMHG | DIASTOLIC BLOOD PRESSURE: 72 MMHG

## 2025-01-07 DIAGNOSIS — R10.31 ABDOMINAL PAIN, RIGHT LOWER QUADRANT: Primary | ICD-10-CM

## 2025-01-07 LAB
ALBUMIN SERPL BCG-MCNC: 4.7 G/DL (ref 3.5–5.1)
ALP SERPL-CCNC: 93 U/L (ref 38–126)
ALT SERPL W/O P-5'-P-CCNC: 23 U/L (ref 11–66)
ANION GAP SERPL CALC-SCNC: 11 MEQ/L (ref 8–16)
AST SERPL-CCNC: 19 U/L (ref 5–40)
B-HCG SERPL QL: NEGATIVE
BASOPHILS ABSOLUTE: 0.1 THOU/MM3 (ref 0–0.1)
BASOPHILS NFR BLD AUTO: 1.2 %
BILIRUB CONJ SERPL-MCNC: 0.3 MG/DL (ref 0.1–13.8)
BILIRUB SERPL-MCNC: 1.1 MG/DL (ref 0.3–1.2)
BILIRUB UR QL STRIP.AUTO: NEGATIVE
BUN SERPL-MCNC: 8 MG/DL (ref 7–22)
CALCIUM SERPL-MCNC: 9.2 MG/DL (ref 8.5–10.5)
CHARACTER UR: CLEAR
CHLORIDE SERPL-SCNC: 100 MEQ/L (ref 98–111)
CO2 SERPL-SCNC: 27 MEQ/L (ref 23–33)
COLOR, UA: YELLOW
CREAT SERPL-MCNC: 0.8 MG/DL (ref 0.4–1.2)
DEPRECATED RDW RBC AUTO: 39.8 FL (ref 35–45)
EOSINOPHIL NFR BLD AUTO: 3.7 %
EOSINOPHILS ABSOLUTE: 0.3 THOU/MM3 (ref 0–0.4)
ERYTHROCYTE [DISTWIDTH] IN BLOOD BY AUTOMATED COUNT: 12 % (ref 11.5–14.5)
GFR SERPL CREATININE-BSD FRML MDRD: > 90 ML/MIN/1.73M2
GLUCOSE SERPL-MCNC: 105 MG/DL (ref 70–108)
GLUCOSE UR QL STRIP.AUTO: NEGATIVE MG/DL
HCT VFR BLD AUTO: 42.9 % (ref 37–47)
HGB BLD-MCNC: 14.3 GM/DL (ref 12–16)
HGB UR QL STRIP.AUTO: NEGATIVE
IMM GRANULOCYTES # BLD AUTO: 0.01 THOU/MM3 (ref 0–0.07)
IMM GRANULOCYTES NFR BLD AUTO: 0.1 %
KETONES UR QL STRIP.AUTO: NEGATIVE
LIPASE SERPL-CCNC: 39.8 U/L (ref 5.6–51.3)
LYMPHOCYTES ABSOLUTE: 1.9 THOU/MM3 (ref 1–4.8)
LYMPHOCYTES NFR BLD AUTO: 25.3 %
MCH RBC QN AUTO: 30.2 PG (ref 26–33)
MCHC RBC AUTO-ENTMCNC: 33.3 GM/DL (ref 32.2–35.5)
MCV RBC AUTO: 90.7 FL (ref 81–99)
MONOCYTES ABSOLUTE: 0.4 THOU/MM3 (ref 0.4–1.3)
MONOCYTES NFR BLD AUTO: 5.6 %
NEUTROPHILS ABSOLUTE: 4.7 THOU/MM3 (ref 1.8–7.7)
NEUTROPHILS NFR BLD AUTO: 64.1 %
NITRITE UR QL STRIP: NEGATIVE
NRBC BLD AUTO-RTO: 0 /100 WBC
OSMOLALITY SERPL CALC.SUM OF ELEC: 274.4 MOSMOL/KG (ref 275–300)
PH UR STRIP.AUTO: 6 [PH] (ref 5–9)
PLATELET # BLD AUTO: 252 THOU/MM3 (ref 130–400)
PMV BLD AUTO: 10.1 FL (ref 9.4–12.4)
POTASSIUM SERPL-SCNC: 3.8 MEQ/L (ref 3.5–5.2)
PROT SERPL-MCNC: 7.4 G/DL (ref 6.1–8)
PROT UR STRIP.AUTO-MCNC: NEGATIVE MG/DL
RBC # BLD AUTO: 4.73 MILL/MM3 (ref 4.2–5.4)
SODIUM SERPL-SCNC: 138 MEQ/L (ref 135–145)
SP GR UR REFRACT.AUTO: 1.01 (ref 1–1.03)
UROBILINOGEN, URINE: 0.2 EU/DL (ref 0–1)
WBC # BLD AUTO: 7.4 THOU/MM3 (ref 4.8–10.8)
WBC #/AREA URNS HPF: NEGATIVE /[HPF]

## 2025-01-07 PROCEDURE — 74177 CT ABD & PELVIS W/CONTRAST: CPT

## 2025-01-07 PROCEDURE — 36415 COLL VENOUS BLD VENIPUNCTURE: CPT

## 2025-01-07 PROCEDURE — 93975 VASCULAR STUDY: CPT

## 2025-01-07 PROCEDURE — 81003 URINALYSIS AUTO W/O SCOPE: CPT

## 2025-01-07 PROCEDURE — 85025 COMPLETE CBC W/AUTO DIFF WBC: CPT

## 2025-01-07 PROCEDURE — 76830 TRANSVAGINAL US NON-OB: CPT

## 2025-01-07 PROCEDURE — 99285 EMERGENCY DEPT VISIT HI MDM: CPT

## 2025-01-07 PROCEDURE — 6360000004 HC RX CONTRAST MEDICATION: Performed by: EMERGENCY MEDICINE

## 2025-01-07 PROCEDURE — 83690 ASSAY OF LIPASE: CPT

## 2025-01-07 PROCEDURE — 80053 COMPREHEN METABOLIC PANEL: CPT

## 2025-01-07 PROCEDURE — 84703 CHORIONIC GONADOTROPIN ASSAY: CPT

## 2025-01-07 PROCEDURE — 82248 BILIRUBIN DIRECT: CPT

## 2025-01-07 RX ORDER — IOPAMIDOL 755 MG/ML
80 INJECTION, SOLUTION INTRAVASCULAR
Status: COMPLETED | OUTPATIENT
Start: 2025-01-07 | End: 2025-01-07

## 2025-01-07 RX ADMIN — IOPAMIDOL 80 ML: 755 INJECTION, SOLUTION INTRAVENOUS at 14:44

## 2025-01-07 NOTE — ED NOTES
RN to room to round on pt, updated on poc  No new needs at this time  Patient resting in bed. Respirations easy and unlabored. No distress noted. Call light within reach.

## 2025-01-07 NOTE — ED PROVIDER NOTES
[facility] EMERGENCY DEPARTMENT  EMERGENCY DEPARTMENT ENCOUNTER          Pt Name: Elsa Shelton  MRN: [id number]  Birthdate 1990  Date of evaluation: 1/7/2025  Physician: Jennifer Cee MD  Supervising Attending Physician: Ozzy White DO       CHIEF COMPLAINT       Chief Complaint   Patient presents with    Abdominal Pain         HISTORY OF PRESENT ILLNESS    HPI  Elsa Shelton is a 34 y.o. G2, P2 female who presents to the emergency department  from work  for evaluation of right lower quadrant abdominal pain x 3 days.  Patient describes it as a contraction.  She states it will intermittently worsen and be a 6 out of 10 but at baseline it is a 2 out of 10.  She has been taking ibuprofen with improvement of her pain.  Reports associated decreased appetite and mild nausea but denies vomiting, diarrhea or constipation.  She has had no associated fevers or chills.  Patient has had pain in her right lower quadrant in the past and it was secondary to ovarian cyst.  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  She denies urinary symptoms such as dysuria, urinary frequency or urgency.  Denies vaginal discharge or bleeding, headache, lightheadedness, dizziness, vision changes, tinnitus, cough, congestion, sore throat, neck pain/stiffness, chest pain, shortness of breath, dysuria, blood in the urine or stool, numbness/tingling/weakness in extremities.  Patient has a history of cholecystectomy, salpingectomy bilaterally and endometrial ablation.  Has not had a menstrual cycle since her endometrial ablation.    The patient has no other acute complaints at this time.      PAST MEDICAL AND SURGICAL HISTORY     Past Medical History:   Diagnosis Date    Heart abnormality     tachy during pregnancy    Infertility, female     took 1 round of clomid first preg    PONV (postoperative nausea and vomiting) 
normal bowel movements.  She has had some mild nausea without vomiting.  No difficulty urinating.  No history of kidney stones.  Onset of symptoms coming and going approximately 3 days.  Here today the patient is hemodynamically stable neurologically intact afebrile resting comfortably on the cot no apparent distress no other physical complaints at this time.      US NON OB TRANSVAGINAL   Final Result   1. Normal ultrasound of the uterus.   2. Normal bilateral ovaries.            **This report has been created using voice recognition software. It may contain   minor errors which are inherent in voice recognition technology.**         Electronically signed by Dr. Popeye Alonso      US DUP ABD PEL RETRO SCROT COMPLETE   Final Result   1. Normal ultrasound of the uterus.   2. Normal bilateral ovaries.            **This report has been created using voice recognition software. It may contain   minor errors which are inherent in voice recognition technology.**         Electronically signed by Dr. Popeye Alonso      CT ABDOMEN PELVIS W IV CONTRAST Additional Contrast? None   Final Result   1. Normal appendix. No bowel obstruction or pericolonic inflammation is   observed. Colonic wall thickening is nonspecific and possibly artifactual   related to under distention. Correlate clinically for colitis.      2. Bladder wall thickening is nonspecific and possibly artifactual related to   under distention. Correlate with urinalysis for urinary tract infection. No   renal calculus or obstructive uropathy is visualized.      3. Chronic findings are discussed.            **This report has been created using voice recognition software.  It may contain   minor errors which are inherent in voice recognition technology.**      Electronically signed by Dr. Ana Olvera        Labs Reviewed   OSMOLALITY - Abnormal; Notable for the following components:       Result Value    Osmolality Calc 274.4 (*)     All other components

## 2025-01-07 NOTE — DISCHARGE INSTRUCTIONS
You were seen in the ED today for right lower quadrant abdominal pain.  Your lab workup was unremarkable.  Your CT scan did not show any appendicitis or bowel obstruction.  It did show possibly the development of colitis.  Follow-up with your PCP within the next 3 to 5 days.  You can alternate between Tylenol and/or ibuprofen as needed for pain every 6 hours.  Ensure you are eating with you take these medications.  Return if you develop fever, chills, persistent nausea or vomiting, new or worsening abdominal pain, chest pain, shortness of breath or any other new or worsening symptoms.

## 2025-01-07 NOTE — ED TRIAGE NOTES
Presents to ED with c/o RLQ abdominal pain that started 3 days go. Reports nausea with pain. Alert and oriented. Respirations easy and unlabored.

## 2025-02-26 ENCOUNTER — LAB (OUTPATIENT)
Dept: LAB | Age: 35
End: 2025-02-26

## 2025-03-06 LAB — CYTOLOGY THIN PREP PAP: NORMAL

## 2025-03-26 ENCOUNTER — HOSPITAL ENCOUNTER (OUTPATIENT)
Dept: PHYSICAL THERAPY | Age: 35
Setting detail: THERAPIES SERIES
Discharge: HOME | End: 2025-03-26
Payer: COMMERCIAL

## 2025-03-26 PROCEDURE — 97167 OT EVAL HIGH COMPLEX 60 MIN: CPT

## 2025-03-26 PROCEDURE — 97110 THERAPEUTIC EXERCISES: CPT

## 2025-03-26 PROCEDURE — 97530 THERAPEUTIC ACTIVITIES: CPT

## 2025-03-26 NOTE — PROGRESS NOTES
Functional Outcome Score PPUF/Freq: 15 (3/26/25)       Insurance Primary: Payor: NORBERTO /  /  / ,   Secondary:    Authorization Information PRE CERTIFICATION REQUIRED: Additional authorization not required.  INSURANCE THERAPY BENEFIT: Allowed 20 visits per calendar year.  0 visits have been used.. [employer].. OT & ST COMBINED  AQUATIC THERAPY COVERED:   MODALITIES COVERED:  Yes   Initial CPT Codes Requested 97110-Therapeutic Exercise,  97140-Manual Therapy, 97530-Therapeutic Activities, 22597-JF ADL Training, 91829-Flpqegqwpayzg Re-Education, 97035-Ultrasound, and 97014-Electrical Stimulation Unattended   Progress Note Counter 1/10 for progress note (Reporting Period: 3/26/25 to present)   Recertification Date 06/18/25   Physician Follow-Up Yearly    Physician Orders Eval and treat   History of Present Illness Pt reports to skilled OT services with c/o pelvic pain that they are not sure what is the cause. Pt reports that the pain has been happening for 2 years and is not sure why. Pt reports that she had a colonoscopy which was all clear.  Pt denies any urinary or bowel dysfunction.      SUBJECTIVE: See above.     Social/Functional History and Current Status:    Elsa Shelton lives with family in a multiple floor home with stairs and a handrail to enter.    Task Previous Current   ADL’s  Independent Independent toileting    IADL's Independent Independent pain impacts    Ambulation Independent Independent pain impacts   Transfers Independent Independent pain impacts    Recreation Independent Independent pain impacts    Community Integration Independent Independent pain impacts    Driving Active  Active    Work Full-Time.  Occupation: [employer]    Full-Time.   pain impacts      PAIN    Location Abdominal pelvic pain R>L    Description Aching, sharp, throbbing    Increased by Standing, movement    Decreased by Ibuprofen, heat    Maximum Intensity 10/10   Best Intensity 2/10   Todays Rating 
no

## 2025-04-02 ENCOUNTER — APPOINTMENT (OUTPATIENT)
Dept: PHYSICAL THERAPY | Age: 35
End: 2025-04-02
Payer: COMMERCIAL

## 2025-04-07 ENCOUNTER — HOSPITAL ENCOUNTER (OUTPATIENT)
Dept: PHYSICAL THERAPY | Age: 35
Setting detail: THERAPIES SERIES
Discharge: HOME | End: 2025-04-07
Payer: COMMERCIAL

## 2025-04-07 PROCEDURE — 97140 MANUAL THERAPY 1/> REGIONS: CPT

## 2025-04-07 NOTE — PROGRESS NOTES
Needed no   Pain After [place] yes: can cause a flare with cramping          OBSERVATION  [] Deferred secondary to:   Patient Safety Patient offered a chaperone and declined.The pt did verbalize consent for internal vaginal examination following OT education of pt on the purpose of this activity and on the procedure using the model.  Pt was educated in the intent of the OT to proceed slowly into the vaginal canal with permission requested of pt at every step of assessment prior to commencement by OT.  Pt was also educated in her right to stop assessment, refuse any portion of this assessment, or to refuse assessment at any time without need for reason.  The pt was educated that refusal would not impact her ability to seek care from this therapist in any way or result in therapist prejudice regarding her motivation to get better.  Pt verbalized understanding and agreed again to internal examination by OT this date.      Skin Condition    Urogenital Triangle Bulbocavernosus tender/tight, Ischiocavernosus tender/tight, STPM tender/tight, Levator ani tender/tight, and OI tight/tender    Introitus     Perineal Descent     External Clock         PELVIC FLOOR INTERNAL EXAM [] Deferred secondary to:   Exam Vaginal   Sensation Intact   Muscle Localization Fair - pt was able to contract the PFM with vc    Palpation/Tone Hypertonic   Pelvic Floor Strength PERF:Power: 2,Endurance: 3,Reps: 10,Flicks: 10   Relax after Contraction Difficult   Prolapse None         PELVIC HEALTH INCONTINENCE TREATMENT   Precautions:    Pain:     “X” in shaded column indicates activity completed today   Exercise/Intervention Reps/Sec  Notes   PF A and P and viscera 5 min      Normal bladder       Diet impact on bladder       Urge control/Urge drills       Precise PFM localization and control 5 min             Guided relaxation and diaphragmatic breathing  5 min             STM 10 min  X    Internal exam  40 min  X    Cupping  5 min  X

## 2025-04-21 ENCOUNTER — HOSPITAL ENCOUNTER (OUTPATIENT)
Dept: PHYSICAL THERAPY | Age: 35
Setting detail: THERAPIES SERIES
Discharge: HOME | End: 2025-04-21
Payer: COMMERCIAL

## 2025-04-21 PROCEDURE — 97110 THERAPEUTIC EXERCISES: CPT

## 2025-04-21 PROCEDURE — 97140 MANUAL THERAPY 1/> REGIONS: CPT

## 2025-04-21 NOTE — PROGRESS NOTES
[facility]  OCCUPATIONAL THERAPY  OUTPATIENT REHABILITATION - SPECIALIZED THERAPY SERVICES  [] PELVIC HEALTH EVALUATION  [x] DAILY NOTE  [] PROGRESS NOTE [] DISCHARGE NOTE    Date: 2025  Patient Name:  Elsa Shelton  : 1990  MRN: [id number]  CSN: [id number]    Referring Practitioner Elsa Stein MD [id number]      Diagnosis  Diagnoses       R10.2 (ICD-10-CM) - Pelvic and perineal pain           Treatment Diagnosis R10.2 - Pelvic and Perineal Pain  N81.84 - Pelvic Muscle Wasting (Female)  R10.30 - Lower Abdominal Pain, Unspecified  N94.10 - Unspecified Dyspareunia  R27.8 - Other Lack of Coordination   Date of Evaluation 3/26/25   Additional Pertinent History Elsa Shelton has a past medical history of Heart abnormality, Infertility, female, PONV (postoperative nausea and vomiting), Postpartum depression, and Prolonged emergence from general anesthesia.  she has a past surgical history that includes [place] tooth extraction; Tonsillectomy (2018); Cholecystectomy, laparoscopic (N/A, 2020); laparoscopy for cyst removal(N/A, 12/15/2022); and tubal ligation and Dilation and curettage of uterus and ablation (N/A, 2024).     Allergies Allergies   Allergen Reactions    Flagyl [Metronidazole] Hives    Sulfa Antibiotics Hives     Reaction as a kid      Azithromycin Hives and Rash     z pack-rash      Medications   Current Outpatient Medications:     ibuprofen (ADVIL) 200 MG tablet, Take 4 tablets by mouth every 8 hours as needed for Pain, Disp: 120 tablet, Rfl: 3    Cyanocobalamin (VITAMIN B 12 PO), Take by mouth (Patient not taking: Reported on 2023), Disp: , Rfl:     acetaminophen (TYLENOL) 500 MG tablet, Take 2 tablets by mouth every 6 hours as needed for Pain, Disp: , Rfl:     sertraline (ZOLOFT) 50 MG tablet, Take 1 tablet by mouth daily, Disp: , Rfl:     Loratadine (CLARITIN PO), Take by mouth, Disp: , Rfl:       Functional Outcome Measure Used PPUF/Freq

## 2025-04-29 ENCOUNTER — APPOINTMENT (OUTPATIENT)
Dept: PHYSICAL THERAPY | Age: 35
End: 2025-04-29
Payer: COMMERCIAL

## 2025-05-05 ENCOUNTER — HOSPITAL ENCOUNTER (OUTPATIENT)
Dept: PHYSICAL THERAPY | Age: 35
Setting detail: THERAPIES SERIES
Discharge: HOME | End: 2025-05-05
Payer: COMMERCIAL

## 2025-05-05 PROCEDURE — 97140 MANUAL THERAPY 1/> REGIONS: CPT

## 2025-05-05 NOTE — PROGRESS NOTES
November 5, 2019      Jerson Price MD  [address]  [facility] [address]           [facility] [address]  [facility] [address]  Phone: [phone]  Fax: [phone]          Patient: Emilia Melgoza   MR Number: [id number]   YOB: 1944   Date of Visit: 11/5/2019       Dear Dr. Jerson Price:    Thank you for referring Emilia Melgoza to me for evaluation. Attached you will find relevant portions of my assessment and plan of care.    If you have questions, please do not hesitate to call me. I look forward to following Emilia Melgoza along with you.    Sincerely,    Belgica Lucas,     Enclosure  CC:  No Recipients    If you would like to receive this communication electronically, please contact [email or web address] or [phone] to request more information on [employer] access.    For providers and/or their staff who would like to refer a patient to [facility], please contact us through our one-stop-shop provider referral line, [facility] , at [phone].    If you feel you have received this communication in error or would no longer like to receive these types of communications, please e-mail [email or web address]              PLAN:  Treatment Recommendations: Strengthening, Endurance Training, Neuromuscular Re-education, Manual Therapy - Soft Tissue Mobilization, Manual Therapy - Joint Manipulation, Pain Management, Home Exercise Program, Patient Education, Self-Care Education and Training, Positioning, Integrative Dry Needling, and Modalities    []  Plan of care initiated.  Plan to see patient 1 times per week for 12 weeks to address the treatment planned outlined above.  [x]  Continue with current plan of care  []  Modify plan of care as follows:    []  Hold pending physician visit  []  Discharge    Time In 1500   Time Out 1555   Timed Code Minutes: 55 min   Total Treatment Time: 55 min       Electronically Signed by: Roma THOMAS, OTR/L [id number]

## 2025-05-20 ENCOUNTER — HOSPITAL ENCOUNTER (OUTPATIENT)
Dept: PHYSICAL THERAPY | Age: 35
Setting detail: THERAPIES SERIES
Discharge: HOME | End: 2025-05-20
Payer: COMMERCIAL

## 2025-05-20 PROCEDURE — 97140 MANUAL THERAPY 1/> REGIONS: CPT

## 2025-05-20 NOTE — PROGRESS NOTES
[facility]  OCCUPATIONAL THERAPY  OUTPATIENT REHABILITATION - SPECIALIZED THERAPY SERVICES  [] PELVIC HEALTH EVALUATION  [x] DAILY NOTE  [] PROGRESS NOTE [] DISCHARGE NOTE    Date: 2025  Patient Name:  Elsa Shelton  : 1990  MRN: [id number]  CSN: [id number]    Referring Practitioner Elsa Stein MD [id number]      Diagnosis  Diagnoses       R10.2 (ICD-10-CM) - Pelvic and perineal pain           Treatment Diagnosis R10.2 - Pelvic and Perineal Pain  N81.84 - Pelvic Muscle Wasting (Female)  R10.30 - Lower Abdominal Pain, Unspecified  N94.10 - Unspecified Dyspareunia  R27.8 - Other Lack of Coordination   Date of Evaluation 3/26/25   Additional Pertinent History Elsa Shelton has a past medical history of Heart abnormality, Infertility, female, PONV (postoperative nausea and vomiting), Postpartum depression, and Prolonged emergence from general anesthesia.  she has a past surgical history that includes Stephan tooth extraction; Tonsillectomy (2018); Cholecystectomy, laparoscopic (N/A, 2020); laparoscopy for cyst removal(N/A, 12/15/2022); and tubal ligation and Dilation and curettage of uterus and ablation (N/A, 2024).     Allergies Allergies   Allergen Reactions    Flagyl [Metronidazole] Hives    Sulfa Antibiotics Hives     Reaction as a kid      Azithromycin Hives and Rash     z pack-rash      Medications   Current Outpatient Medications:     ibuprofen (ADVIL) 200 MG tablet, Take 4 tablets by mouth every 8 hours as needed for Pain, Disp: 120 tablet, Rfl: 3    Cyanocobalamin (VITAMIN B 12 PO), Take by mouth (Patient not taking: Reported on 2023), Disp: , Rfl:     acetaminophen (TYLENOL) 500 MG tablet, Take 2 tablets by mouth every 6 hours as needed for Pain, Disp: , Rfl:     sertraline (ZOLOFT) 50 MG tablet, Take 1 tablet by mouth daily, Disp: , Rfl:     Loratadine (CLARITIN PO), Take by mouth, Disp: , Rfl:       Functional Outcome Measure Used PPUF/Freq

## 2025-06-12 ENCOUNTER — HOSPITAL ENCOUNTER (OUTPATIENT)
Dept: PHYSICAL THERAPY | Age: 35
Setting detail: THERAPIES SERIES
Discharge: HOME | End: 2025-06-12
Payer: COMMERCIAL

## 2025-06-12 PROCEDURE — 97140 MANUAL THERAPY 1/> REGIONS: CPT

## 2025-06-12 NOTE — PROGRESS NOTES
[facility]  OCCUPATIONAL THERAPY  OUTPATIENT REHABILITATION - SPECIALIZED THERAPY SERVICES  [] PELVIC HEALTH EVALUATION  [x] DAILY NOTE  [] PROGRESS NOTE [] DISCHARGE NOTE    Date: 2025  Patient Name:  Elsa Shelton  : 1990  MRN: [id number]  CSN: [id number]    Referring Practitioner Elsa Stein MD [id number]      Diagnosis  Diagnoses       R10.2 (ICD-10-CM) - Pelvic and perineal pain           Treatment Diagnosis R10.2 - Pelvic and Perineal Pain  N81.84 - Pelvic Muscle Wasting (Female)  R10.30 - Lower Abdominal Pain, Unspecified  N94.10 - Unspecified Dyspareunia  R27.8 - Other Lack of Coordination   Date of Evaluation 3/26/25   Additional Pertinent History Elsa Shelton has a past medical history of Heart abnormality, Infertility, female, PONV (postoperative nausea and vomiting), Postpartum depression, and Prolonged emergence from general anesthesia.  she has a past surgical history that includes Condon tooth extraction; Tonsillectomy (2018); Cholecystectomy, laparoscopic (N/A, 2020); laparoscopy for cyst removal(N/A, 12/15/2022); and tubal ligation and Dilation and curettage of uterus and ablation (N/A, 2024).     Allergies Allergies   Allergen Reactions    Flagyl [Metronidazole] Hives    Sulfa Antibiotics Hives     Reaction as a kid      Azithromycin Hives and Rash     z pack-rash      Medications   Current Outpatient Medications:     ibuprofen (ADVIL) 200 MG tablet, Take 4 tablets by mouth every 8 hours as needed for Pain, Disp: 120 tablet, Rfl: 3    Cyanocobalamin (VITAMIN B 12 PO), Take by mouth (Patient not taking: Reported on 2023), Disp: , Rfl:     acetaminophen (TYLENOL) 500 MG tablet, Take 2 tablets by mouth every 6 hours as needed for Pain, Disp: , Rfl:     sertraline (ZOLOFT) 50 MG tablet, Take 1 tablet by mouth daily, Disp: , Rfl:     Loratadine (CLARITIN PO), Take by mouth, Disp: , Rfl:       Functional Outcome Measure Used PPUF/Freq

## 2025-06-26 ENCOUNTER — APPOINTMENT (OUTPATIENT)
Dept: PHYSICAL THERAPY | Age: 35
End: 2025-06-26
Payer: COMMERCIAL

## (undated) DEVICE — SOLUTION SCRB 4OZ 4% CHG H2O AIDED FOR PREOPERATIVE SKIN

## (undated) DEVICE — COLUMN DRAPE

## (undated) DEVICE — GLOVE SURG SZ 65 THK91MIL LTX FREE SYN POLYISOPRENE

## (undated) DEVICE — TUBING, SUCTION, 1/4" X 12', STRAIGHT: Brand: MEDLINE

## (undated) DEVICE — SUTURE ETHBND D SPEC NO 0 UR 6 30IN D9436

## (undated) DEVICE — ARM DRAPE

## (undated) DEVICE — SOLUTION IV 1000ML 0.9% SOD CHL PH 5 INJ USP VIAFLX PLAS

## (undated) DEVICE — INTENDED FOR TISSUE SEPARATION, AND OTHER PROCEDURES THAT REQUIRE A SHARP SURGICAL BLADE TO PUNCTURE OR CUT.: Brand: BARD-PARKER ® CARBON RIB-BACK BLADES

## (undated) DEVICE — PENCIL SMK EVAC ALL IN 1 DSGN ENH VISIBILITY IMPROVED AIR

## (undated) DEVICE — PACK PROCEDURE SURG SET UP SRMC

## (undated) DEVICE — COVER ARMBRD W13XL28.5IN IMPERV BLU FOR OP RM

## (undated) DEVICE — GOWN,SIRUS,NON REINFRCD,LARGE,SET IN SL: Brand: MEDLINE

## (undated) DEVICE — PACK-MAJOR

## (undated) DEVICE — PENCIL SMK EVAC 10 FT BLADE ELECTRD ROCKER

## (undated) DEVICE — TUBING, SUCTION, 1/4" X 20', STRAIGHT: Brand: MEDLINE INDUSTRIES, INC.

## (undated) DEVICE — STRIP,CLOSURE,WOUND,MEDI-STRIP,1/2X4: Brand: MEDLINE

## (undated) DEVICE — TUBING INSUFFLATOR HEAT HUMIDIFIED SMK EVAC SET PNEUMOCLEAR

## (undated) DEVICE — HYPODERMIC SAFETY NEEDLE: Brand: MAGELLAN

## (undated) DEVICE — LAPAROSCOPY PACK: Brand: MEDLINE INDUSTRIES, INC.

## (undated) DEVICE — BLADELESS OBTURATOR: Brand: WECK VISTA

## (undated) DEVICE — CATHETER CHOLGM 4.5FR L18IN TIP 5.5FR W/ MTL SUPP TB TAUT

## (undated) DEVICE — Z DUP USE 2641840 CLIP INT L POLYMER LOK LIG HEM O LOK

## (undated) DEVICE — APPLIER CLP M/L SHFT DIA5MM 15 LIG LIGAMAX 5

## (undated) DEVICE — TIP COVER ACCESSORY

## (undated) DEVICE — 4-PORT MANIFOLD: Brand: NEPTUNE 2

## (undated) DEVICE — TROCAR: Brand: KII FIOS FIRST ENTRY

## (undated) DEVICE — SURGICEL ENDOSCP APPL

## (undated) DEVICE — SUTURE VCRL + SZ 2-0 L27IN ABSRB VLT UR-6 5/8 CIR TAPR PNT VCP602H

## (undated) DEVICE — 35 ML SYRINGE LUER-LOCK TIP: Brand: MONOJECT

## (undated) DEVICE — SOLUTION IV IRRIG WATER 1500ML POUR BRL 2F7115

## (undated) DEVICE — GAUZE,SPONGE,8"X4",12PLY,XRAY,STRL,LF: Brand: MEDLINE

## (undated) DEVICE — INTRODUCER CATH L3.5IN DIA7.5FR FOR CHOLANGIOGRAPHY TAUT

## (undated) DEVICE — BASIC SINGLE BASIN BTC-LF: Brand: MEDLINE INDUSTRIES, INC.

## (undated) DEVICE — GENERAL LAPAROSCOPY PACK-LF: Brand: MEDLINE INDUSTRIES, INC.

## (undated) DEVICE — SYRINGE MED 10ML LUERLOCK TIP W/O SFTY DISP

## (undated) DEVICE — ELECTRO LUBE IS A SINGLE PATIENT USE DEVICE THAT IS INTENDED TO BE USED ON ELECTROSURGICAL ELECTRODES TO REDUCE STICKING.: Brand: KEY SURGICAL ELECTRO LUBE

## (undated) DEVICE — REDUCER: Brand: ENDOWRIST

## (undated) DEVICE — SYRINGE CATH TIP 50ML

## (undated) DEVICE — SYSTEM WST MGMT W/ CAP AVETA

## (undated) DEVICE — Z DUP USE 2257490 ADHESIVE SKIN CLSRE 036ML TPCL 2CTL CNCRLTE HIGH VSCSTY DRMB

## (undated) DEVICE — PUMP SUC IRR TBNG L10FT W/ HNDPC ASSEMB STRYKEFLOW 2

## (undated) DEVICE — LINER SUCT CANSTR 1500CC SEMI RIG W/ POR HYDROPHOBIC SHUT

## (undated) DEVICE — 1840 FOAM BLOCK NEEDLE COUNTER: Brand: DEVON

## (undated) DEVICE — Y-TYPE TUR/BLADDER IRRIGATION SET, REGULATING CLAMP

## (undated) DEVICE — PREMIUM DRY TRAY LF: Brand: MEDLINE INDUSTRIES, INC.

## (undated) DEVICE — BAG SPEC REM 224ML W4XL6IN DIA10MM 1 HND GYN DISP ENDOPCH

## (undated) DEVICE — SEALER ENDOSCP L37CM NANO COAT BLNT TIP LAP DIV

## (undated) DEVICE — DRAPE,UNDERBUTTOCKS,PCH,STERILE: Brand: MEDLINE

## (undated) DEVICE — DRAPE C ARM W36XL30IN RECTANG BND BG AND TAPE

## (undated) DEVICE — CANNULA SEAL

## (undated) DEVICE — SUTURE VCRL + SZ 4-0 L27IN ABSRB WHT FS-2 3/8 CIR REV CUT VCP422H

## (undated) DEVICE — TISSUE RETRIEVAL SYSTEM: Brand: INZII RETRIEVAL SYSTEM

## (undated) DEVICE — SOLUTION ANTIFOG VIS SYS CLEARIFY LAPSCP

## (undated) DEVICE — TUBING INSUFFLATION SMK EVAC HI FLO SET PNEUMOCLEAR

## (undated) DEVICE — HANDPIECE (PACKAGE IS THE ONLY VALID ORDER UOM) ABLAT DISP FOR ENDOMET SYS

## (undated) DEVICE — AGENT HEMSTAT 3GM OXIDIZED REGENERATED CELOS ABSRB FOR CONT (ORDER MULTIPLES OF 5EA)

## (undated) DEVICE — SEALER LAP L37CM MARYLAND JAW OPN NANO COAT MULTIFUNCTIONAL

## (undated) DEVICE — SEAL

## (undated) DEVICE — PREMIUM WET SKIN PREP TRAY CHG: Brand: MEDLINE INDUSTRIES, INC.

## (undated) DEVICE — CATHETER,URETHRAL,REDRUBBER,STRL,14FR: Brand: MEDLINE INDUSTRIES, INC.

## (undated) DEVICE — YANKAUER,BULB TIP,W/O VENT,RIGID,STERILE: Brand: MEDLINE

## (undated) DEVICE — PAD,SANITARY,11 IN,MAXI,W/WINGS,N-STRL: Brand: MEDLINE

## (undated) DEVICE — Device

## (undated) DEVICE — SYSTEM WST MGMT AVETA